# Patient Record
Sex: FEMALE | Race: WHITE | Employment: OTHER | URBAN - METROPOLITAN AREA
[De-identification: names, ages, dates, MRNs, and addresses within clinical notes are randomized per-mention and may not be internally consistent; named-entity substitution may affect disease eponyms.]

---

## 2019-08-29 ENCOUNTER — HOSPITAL ENCOUNTER (OUTPATIENT)
Age: 84
Discharge: HOME | End: 2019-08-29
Attending: FAMILY MEDICINE
Payer: MEDICARE

## 2019-08-29 VITALS
HEART RATE: 78 BPM | HEIGHT: 59.5 IN | SYSTOLIC BLOOD PRESSURE: 179 MMHG | DIASTOLIC BLOOD PRESSURE: 66 MMHG | OXYGEN SATURATION: 100 % | BODY MASS INDEX: 25.86 KG/M2 | TEMPERATURE: 98 F | WEIGHT: 130 LBS | RESPIRATION RATE: 16 BRPM

## 2019-08-29 DIAGNOSIS — S51.811A SKIN TEAR OF RIGHT FOREARM WITHOUT COMPLICATION, INITIAL ENCOUNTER: Primary | ICD-10-CM

## 2019-08-29 PROCEDURE — 90471 IMMUNIZATION ADMIN: CPT

## 2019-08-29 PROCEDURE — 99203 OFFICE O/P NEW LOW 30 MIN: CPT

## 2019-08-29 RX ORDER — TETANUS AND DIPHTHERIA TOXOIDS ADSORBED 2; 2 [LF]/.5ML; [LF]/.5ML
0.5 INJECTION INTRAMUSCULAR ONCE
Status: COMPLETED | OUTPATIENT
Start: 2019-08-29 | End: 2019-08-29

## 2019-08-29 NOTE — ED NOTES
Discharge instructions reviewed with patient and pt niece. Advised to assess wound on right arm 3 times daily with dressing changes. Pt verbalized understanding. Instructed to follow up with PCP for wound check in one day.  Monitor arm for signs of infectio

## 2019-08-29 NOTE — ED PROVIDER NOTES
Patient Seen in: [address]    History   Patient presents with:  Rash Skin Problem (integumentary)    Stated Complaint: laceration on arm    HPI  Pt is here with her niece.    Laceration to R forearm with a screen door me MDM     Disposition and Plan     Clinical Impression:  Skin tear of right forearm without complication, initial encounter  (primary encounter diagnosis)     Rec local wound care with topical mupirocin oint.    RTC or see PCP in 3-4 days for wound check

## 2019-08-29 NOTE — ED INITIAL ASSESSMENT (HPI)
Pt to IC with skin tear to right forearm. Pt states she caught her arm on her screen door on Tuesday 2 days ago.

## 2023-10-17 RX ORDER — LIDOCAINE 4 G/G
1 PATCH TOPICAL EVERY 24 HOURS
COMMUNITY
End: 2023-11-03 | Stop reason: ALTCHOICE

## 2023-10-17 RX ORDER — AMLODIPINE BESYLATE 2.5 MG/1
1 TABLET ORAL DAILY
COMMUNITY
Start: 2023-06-10 | End: 2023-11-03 | Stop reason: SDUPTHER

## 2023-10-17 RX ORDER — GABAPENTIN 100 MG/1
100 CAPSULE ORAL DAILY
COMMUNITY
Start: 2023-10-13 | End: 2023-11-03 | Stop reason: ALTCHOICE

## 2023-10-17 RX ORDER — METHENAMINE HIPPURATE 1000 MG/1
1 TABLET ORAL 2 TIMES DAILY
COMMUNITY
Start: 2023-08-08 | End: 2023-11-03 | Stop reason: ALTCHOICE

## 2023-10-17 RX ORDER — CALCIUM CITRATE/VITAMIN D3 200MG-6.25
1 TABLET ORAL DAILY
COMMUNITY
End: 2023-11-03 | Stop reason: ALTCHOICE

## 2023-10-17 RX ORDER — MIRTAZAPINE 7.5 MG/1
1 TABLET, FILM COATED ORAL AT BEDTIME
COMMUNITY
Start: 2023-08-08 | End: 2023-11-03 | Stop reason: ALTCHOICE

## 2023-10-17 RX ORDER — SACCHAROMYCES BOULARDII 250 MG
250 CAPSULE ORAL DAILY
COMMUNITY
End: 2023-11-22

## 2023-10-17 RX ORDER — CALCIUM CARBONATE/VITAMIN D3 600 MG-10
1 TABLET ORAL DAILY
COMMUNITY
Start: 2023-10-13 | End: 2023-11-03 | Stop reason: ALTCHOICE

## 2023-10-17 RX ORDER — OMEPRAZOLE 20 MG/1
1 CAPSULE, DELAYED RELEASE ORAL DAILY
COMMUNITY
Start: 2023-06-10 | End: 2023-11-22

## 2023-10-17 RX ORDER — URSODIOL 250 MG/1
250 TABLET, FILM COATED ORAL DAILY
COMMUNITY
Start: 2023-08-01 | End: 2023-11-30

## 2023-10-17 RX ORDER — ANTIARTHRITIC COMBINATION NO.2 900 MG
TABLET ORAL
COMMUNITY
End: 2023-11-21

## 2023-10-17 RX ORDER — SENNOSIDES 8.6 MG
650 CAPSULE ORAL
COMMUNITY
End: 2023-11-29

## 2023-10-17 RX ORDER — CARBOXYMETHYLCELLULOSE SODIUM 5 MG/ML
1 SOLUTION/ DROPS OPHTHALMIC 2 TIMES DAILY
COMMUNITY
Start: 2023-06-07 | End: 2023-11-22 | Stop reason: ALTCHOICE

## 2023-11-03 ENCOUNTER — OFFICE VISIT (OUTPATIENT)
Dept: INTERNAL MEDICINE | Age: 88
End: 2023-11-03

## 2023-11-03 ENCOUNTER — LAB SERVICES (OUTPATIENT)
Dept: LAB | Age: 88
End: 2023-11-03

## 2023-11-03 VITALS
BODY MASS INDEX: 23.95 KG/M2 | RESPIRATION RATE: 20 BRPM | SYSTOLIC BLOOD PRESSURE: 128 MMHG | HEIGHT: 57 IN | DIASTOLIC BLOOD PRESSURE: 52 MMHG | HEART RATE: 76 BPM | WEIGHT: 111 LBS

## 2023-11-03 DIAGNOSIS — M35.3 POLYMYALGIA RHEUMATICA (CMD): ICD-10-CM

## 2023-11-03 DIAGNOSIS — K80.50 BILIARY COLIC: ICD-10-CM

## 2023-11-03 DIAGNOSIS — Z01.89 ROUTINE LAB DRAW: ICD-10-CM

## 2023-11-03 DIAGNOSIS — I10 PRIMARY HYPERTENSION: ICD-10-CM

## 2023-11-03 DIAGNOSIS — M06.9 RHEUMATOID ARTHRITIS, INVOLVING UNSPECIFIED SITE, UNSPECIFIED WHETHER RHEUMATOID FACTOR PRESENT (CMD): ICD-10-CM

## 2023-11-03 DIAGNOSIS — K21.9 GASTROESOPHAGEAL REFLUX DISEASE, UNSPECIFIED WHETHER ESOPHAGITIS PRESENT: ICD-10-CM

## 2023-11-03 DIAGNOSIS — I10 PRIMARY HYPERTENSION: Primary | ICD-10-CM

## 2023-11-03 LAB
ALBUMIN SERPL-MCNC: 3.3 G/DL (ref 3.6–5.1)
ALBUMIN/GLOB SERPL: 0.8 {RATIO} (ref 1–2.4)
ALP SERPL-CCNC: 61 UNITS/L (ref 45–117)
ALT SERPL-CCNC: 7 UNITS/L
ANION GAP SERPL CALC-SCNC: 12 MMOL/L (ref 7–19)
AST SERPL-CCNC: 15 UNITS/L
BILIRUB SERPL-MCNC: 0.3 MG/DL (ref 0.2–1)
BUN SERPL-MCNC: 30 MG/DL (ref 6–20)
BUN/CREAT SERPL: 21 (ref 7–25)
CALCIUM SERPL-MCNC: 9 MG/DL (ref 8.4–10.2)
CHLORIDE SERPL-SCNC: 103 MMOL/L (ref 97–110)
CO2 SERPL-SCNC: 26 MMOL/L (ref 21–32)
CREAT SERPL-MCNC: 1.4 MG/DL (ref 0.51–0.95)
EGFRCR SERPLBLD CKD-EPI 2021: 33 ML/MIN/{1.73_M2}
FASTING DURATION TIME PATIENT: ABNORMAL H
GLOBULIN SER-MCNC: 3.9 G/DL (ref 2–4)
GLUCOSE SERPL-MCNC: 106 MG/DL (ref 70–99)
POTASSIUM SERPL-SCNC: 4.8 MMOL/L (ref 3.4–5.1)
PROT SERPL-MCNC: 7.2 G/DL (ref 6.4–8.2)
SODIUM SERPL-SCNC: 136 MMOL/L (ref 135–145)
TSH SERPL-ACNC: 0.79 MCUNITS/ML (ref 0.35–5)

## 2023-11-03 PROCEDURE — 99204 OFFICE O/P NEW MOD 45 MIN: CPT | Performed by: STUDENT IN AN ORGANIZED HEALTH CARE EDUCATION/TRAINING PROGRAM

## 2023-11-03 PROCEDURE — 36415 COLL VENOUS BLD VENIPUNCTURE: CPT | Performed by: INTERNAL MEDICINE

## 2023-11-03 PROCEDURE — 84443 ASSAY THYROID STIM HORMONE: CPT | Performed by: CLINICAL MEDICAL LABORATORY

## 2023-11-03 PROCEDURE — 80053 COMPREHEN METABOLIC PANEL: CPT | Performed by: CLINICAL MEDICAL LABORATORY

## 2023-11-03 RX ORDER — AMLODIPINE BESYLATE 5 MG/1
5 TABLET ORAL DAILY
COMMUNITY

## 2023-11-03 ASSESSMENT — PATIENT HEALTH QUESTIONNAIRE - PHQ9
CLINICAL INTERPRETATION OF PHQ2 SCORE: NO FURTHER SCREENING NEEDED
1. LITTLE INTEREST OR PLEASURE IN DOING THINGS: NOT AT ALL
SUM OF ALL RESPONSES TO PHQ9 QUESTIONS 1 AND 2: 0
SUM OF ALL RESPONSES TO PHQ9 QUESTIONS 1 AND 2: 0
2. FEELING DOWN, DEPRESSED OR HOPELESS: NOT AT ALL

## 2023-11-05 ENCOUNTER — E-ADVICE (OUTPATIENT)
Dept: INTERNAL MEDICINE | Age: 88
End: 2023-11-05

## 2023-11-05 ENCOUNTER — HOSPITAL ENCOUNTER (EMERGENCY)
Age: 88
Discharge: HOME | End: 2023-11-05
Attending: EMERGENCY MEDICINE

## 2023-11-05 ENCOUNTER — APPOINTMENT (OUTPATIENT)
Dept: CT IMAGING | Age: 88
End: 2023-11-05
Attending: EMERGENCY MEDICINE

## 2023-11-05 VITALS
BODY MASS INDEX: 25.86 KG/M2 | TEMPERATURE: 97.9 F | RESPIRATION RATE: 20 BRPM | DIASTOLIC BLOOD PRESSURE: 72 MMHG | WEIGHT: 119.49 LBS | SYSTOLIC BLOOD PRESSURE: 158 MMHG | OXYGEN SATURATION: 96 % | HEART RATE: 90 BPM
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
## 2023-11-05 DIAGNOSIS — N39.0 ACUTE UTI: Primary | ICD-10-CM

## 2023-11-05 LAB
ALBUMIN SERPL-MCNC: 3.5 G/DL (ref 3.6–5.1)
ALBUMIN/GLOB SERPL: 0.9 {RATIO} (ref 1–2.4)
ALP SERPL-CCNC: 65 UNITS/L (ref 45–117)
ALT SERPL-CCNC: 14 UNITS/L
ANION GAP SERPL CALC-SCNC: 9 MMOL/L (ref 7–19)
APPEARANCE UR: CLEAR
AST SERPL-CCNC: 13 UNITS/L
ATRIAL RATE (BPM): 81
BACTERIA #/AREA URNS HPF: ABNORMAL /HPF
BASOPHILS # BLD: 0.1 K/MCL (ref 0–0.3)
BASOPHILS NFR BLD: 1 %
BILIRUB SERPL-MCNC: 0.4 MG/DL (ref 0.2–1)
BILIRUB UR QL STRIP: NEGATIVE
BUN SERPL-MCNC: 27 MG/DL (ref 6–20)
BUN/CREAT SERPL: 22 (ref 7–25)
CALCIUM SERPL-MCNC: 9.4 MG/DL (ref 8.4–10.2)
CHLORIDE SERPL-SCNC: 103 MMOL/L (ref 97–110)
CO2 SERPL-SCNC: 29 MMOL/L (ref 21–32)
COLOR UR: ABNORMAL
CREAT SERPL-MCNC: 1.23 MG/DL (ref 0.51–0.95)
DEPRECATED RDW RBC: 46.1 FL (ref 39–50)
EGFRCR SERPLBLD CKD-EPI 2021: 39 ML/MIN/{1.73_M2}
EOSINOPHIL # BLD: 0.2 K/MCL (ref 0–0.5)
EOSINOPHIL NFR BLD: 2 %
ERYTHROCYTE [DISTWIDTH] IN BLOOD: 14 % (ref 11–15)
FASTING DURATION TIME PATIENT: ABNORMAL H
GLOBULIN SER-MCNC: 3.9 G/DL (ref 2–4)
GLUCOSE SERPL-MCNC: 104 MG/DL (ref 70–99)
GLUCOSE UR STRIP-MCNC: NEGATIVE MG/DL
HCT VFR BLD CALC: 38.6 % (ref 36–46.5)
HGB BLD-MCNC: 12.6 G/DL (ref 12–15.5)
HGB UR QL STRIP: ABNORMAL
HYALINE CASTS #/AREA URNS LPF: ABNORMAL /LPF
IMM GRANULOCYTES # BLD AUTO: 0 K/MCL (ref 0–0.2)
IMM GRANULOCYTES # BLD: 0 %
KETONES UR STRIP-MCNC: NEGATIVE MG/DL
LEUKOCYTE ESTERASE UR QL STRIP: ABNORMAL
LYMPHOCYTES # BLD: 1 K/MCL (ref 1–4)
LYMPHOCYTES NFR BLD: 13 %
MCH RBC QN AUTO: 29.2 PG (ref 26–34)
MCHC RBC AUTO-ENTMCNC: 32.6 G/DL (ref 32–36.5)
MCV RBC AUTO: 89.4 FL (ref 78–100)
MONOCYTES # BLD: 0.5 K/MCL (ref 0.3–0.9)
MONOCYTES NFR BLD: 7 %
NEUTROPHILS # BLD: 5.6 K/MCL (ref 1.8–7.7)
NEUTROPHILS NFR BLD: 77 %
NITRITE UR QL STRIP: NEGATIVE
NRBC BLD MANUAL-RTO: 0 /100 WBC
P AXIS (DEGREES): 78
PH UR STRIP: 5.5 [PH] (ref 5–7)
PLATELET # BLD AUTO: 297 K/MCL (ref 140–450)
POTASSIUM SERPL-SCNC: 4.6 MMOL/L (ref 3.4–5.1)
PR-INTERVAL (MSEC): 242
PROT SERPL-MCNC: 7.4 G/DL (ref 6.4–8.2)
PROT UR STRIP-MCNC: ABNORMAL MG/DL
QRS-INTERVAL (MSEC): 118
QT-INTERVAL (MSEC): 372
QTC: 432
R AXIS (DEGREES): -44
RAINBOW EXTRA TUBES HOLD SPECIMEN: NORMAL
RAINBOW EXTRA TUBES HOLD SPECIMEN: NORMAL
RBC # BLD: 4.32 MIL/MCL (ref 4–5.2)
RBC #/AREA URNS HPF: ABNORMAL /HPF
REPORT TEXT: NORMAL
SODIUM SERPL-SCNC: 136 MMOL/L (ref 135–145)
SP GR UR STRIP: 1.01 (ref 1–1.03)
SQUAMOUS #/AREA URNS HPF: ABNORMAL /HPF
T AXIS (DEGREES): 68
TRANS CELLS #/AREA URNS HPF: ABNORMAL /HPF
UROBILINOGEN UR STRIP-MCNC: 0.2 MG/DL
VENTRICULAR RATE EKG/MIN (BPM): 81
WBC # BLD: 7.4 K/MCL (ref 4.2–11)
WBC #/AREA URNS HPF: >100 /HPF

## 2023-11-05 PROCEDURE — 10004180 CT HEAD WO CONTRAST

## 2023-11-05 PROCEDURE — 70450 CT HEAD/BRAIN W/O DYE: CPT | Performed by: RADIOLOGY

## 2023-11-05 PROCEDURE — 87086 URINE CULTURE/COLONY COUNT: CPT | Performed by: EMERGENCY MEDICINE

## 2023-11-05 PROCEDURE — 10002800 HB RX 250 W HCPCS: Performed by: EMERGENCY MEDICINE

## 2023-11-05 PROCEDURE — 36415 COLL VENOUS BLD VENIPUNCTURE: CPT

## 2023-11-05 PROCEDURE — 80053 COMPREHEN METABOLIC PANEL: CPT | Performed by: EMERGENCY MEDICINE

## 2023-11-05 PROCEDURE — 85025 COMPLETE CBC W/AUTO DIFF WBC: CPT | Performed by: EMERGENCY MEDICINE

## 2023-11-05 PROCEDURE — 93010 ELECTROCARDIOGRAM REPORT: CPT | Performed by: INTERNAL MEDICINE

## 2023-11-05 PROCEDURE — 96374 THER/PROPH/DIAG INJ IV PUSH: CPT

## 2023-11-05 PROCEDURE — 93005 ELECTROCARDIOGRAM TRACING: CPT | Performed by: EMERGENCY MEDICINE

## 2023-11-05 PROCEDURE — 99284 EMERGENCY DEPT VISIT MOD MDM: CPT

## 2023-11-05 PROCEDURE — 81001 URINALYSIS AUTO W/SCOPE: CPT | Performed by: EMERGENCY MEDICINE

## 2023-11-05 PROCEDURE — 99285 EMERGENCY DEPT VISIT HI MDM: CPT | Performed by: EMERGENCY MEDICINE

## 2023-11-05 RX ORDER — CEFAZOLIN SODIUM/WATER 2 G/20 ML
2000 SYRINGE (ML) INTRAVENOUS ONCE
Status: COMPLETED | OUTPATIENT
Start: 2023-11-05 | End: 2023-11-05

## 2023-11-05 RX ORDER — CEPHALEXIN 500 MG/1
500 CAPSULE ORAL 4 TIMES DAILY
Qty: 28 CAPSULE | Refills: 0 | Status: SHIPPED | OUTPATIENT
Start: 2023-11-05 | End: 2023-11-12

## 2023-11-05 RX ADMIN — CEFTRIAXONE SODIUM 2000 MG: 10 INJECTION, POWDER, FOR SOLUTION INTRAVENOUS at 11:26

## 2023-11-05 ASSESSMENT — ENCOUNTER SYMPTOMS
EYE REDNESS: 0
DIARRHEA: 0
COUGH: 0
COLOR CHANGE: 0
WEAKNESS: 1
NUMBNESS: 0
TROUBLE SWALLOWING: 0
NERVOUS/ANXIOUS: 0
FATIGUE: 1
SHORTNESS OF BREATH: 0
FEVER: 0
NAUSEA: 0
CHILLS: 0
VOMITING: 0
ABDOMINAL PAIN: 0
EYE DISCHARGE: 0

## 2023-11-05 ASSESSMENT — PAIN SCALES - GENERAL
PAINLEVEL_OUTOF10: 0
PAINLEVEL_OUTOF10: 0

## 2023-11-06 ENCOUNTER — E-ADVICE (OUTPATIENT)
Dept: INTERNAL MEDICINE | Age: 88
End: 2023-11-06

## 2023-11-06 ENCOUNTER — EXTERNAL RECORD (OUTPATIENT)
Dept: OTHER | Age: 88
End: 2023-11-06

## 2023-11-21 RX ORDER — ANTIARTHRITIC COMBINATION NO.2 900 MG
TABLET ORAL
Qty: 28 TABLET | Refills: 0 | Status: SHIPPED | OUTPATIENT
Start: 2023-11-21

## 2023-11-22 RX ORDER — NICOTINE 14MG/24HR
PATCH, TRANSDERMAL 24 HOURS TRANSDERMAL
Qty: 90 CAPSULE | Refills: 1 | Status: SHIPPED | OUTPATIENT
Start: 2023-11-22

## 2023-11-22 RX ORDER — CRANBERRY FRUIT EXTRACT 250 MG
CAPSULE ORAL
Qty: 90 CAPSULE | Refills: 1 | Status: SHIPPED | OUTPATIENT
Start: 2023-11-22

## 2023-11-22 RX ORDER — GUAIFENESIN 600 MG/1
TABLET, EXTENDED RELEASE ORAL
Qty: 28 TABLET | Refills: 0 | OUTPATIENT
Start: 2023-11-22

## 2023-11-22 RX ORDER — PREDNISONE 1 MG/1
TABLET ORAL
Qty: 270 TABLET | Refills: 1 | Status: SHIPPED | OUTPATIENT
Start: 2023-11-22

## 2023-11-22 RX ORDER — AMLODIPINE BESYLATE 5 MG/1
TABLET ORAL
Qty: 90 TABLET | Refills: 1 | Status: SHIPPED | OUTPATIENT
Start: 2023-11-22

## 2023-11-22 RX ORDER — OMEPRAZOLE 20 MG/1
CAPSULE, DELAYED RELEASE ORAL
Qty: 90 CAPSULE | Refills: 1 | Status: SHIPPED | OUTPATIENT
Start: 2023-11-22

## 2023-11-24 ENCOUNTER — E-ADVICE (OUTPATIENT)
Dept: INTERNAL MEDICINE | Age: 88
End: 2023-11-24

## 2023-11-28 ENCOUNTER — APPOINTMENT (OUTPATIENT)
Dept: INTERNAL MEDICINE | Age: 88
End: 2023-11-28

## 2023-11-29 RX ORDER — SENNOSIDES 8.6 MG
CAPSULE ORAL
Qty: 30 TABLET | Refills: 11 | Status: SHIPPED | OUTPATIENT
Start: 2023-11-29 | End: 2023-12-06 | Stop reason: DRUGHIGH

## 2023-11-30 RX ORDER — URSODIOL 250 MG/1
250 TABLET, FILM COATED ORAL DAILY
Qty: 28 TABLET | Refills: 11 | Status: SHIPPED | OUTPATIENT
Start: 2023-11-30

## 2023-12-06 RX ORDER — SENNOSIDES 8.6 MG
650 CAPSULE ORAL EVERY 8 HOURS PRN
Qty: 60 TABLET | Refills: 11 | Status: SHIPPED | OUTPATIENT
Start: 2023-12-06

## 2023-12-08 ENCOUNTER — EXTERNAL LAB (OUTPATIENT)
Dept: INTERNAL MEDICINE | Age: 88
End: 2023-12-08

## 2023-12-08 ENCOUNTER — TELEPHONE (OUTPATIENT)
Dept: INTERNAL MEDICINE | Age: 88
End: 2023-12-08

## 2023-12-08 DIAGNOSIS — R39.9 GU (GENITOURINARY) SYMPTOMS: Primary | ICD-10-CM

## 2023-12-08 LAB
APPEARANCE UR: NORMAL
BACTERIA #/AREA URNS HPF: NORMAL /HPF
BILIRUB UR QL: NEGATIVE
COLOR UR: YELLOW
GLUCOSE UR-MCNC: NEGATIVE MG/DL
HGB UR QL: NORMAL
HYALINE CASTS #/AREA URNS LPF: NORMAL /LPF
KETONES UR-MCNC: NEGATIVE MG/DL
LAB RESULT: NORMAL
LEUKOCYTE ESTERASE UR QL STRIP: NORMAL
NITRITE UR QL: POSITIVE
PH UR: 5.5 [PH] (ref 5–8)
PROT UR QL: NORMAL
RBC #/AREA URNS HPF: NORMAL /HPF
SP GR UR: 1.01 (ref 1–1.03)
SQUAMOUS #/AREA URNS HPF: NORMAL /HPF
WBC #/AREA URNS HPF: NORMAL /HPF

## 2023-12-11 ENCOUNTER — E-ADVICE (OUTPATIENT)
Dept: INTERNAL MEDICINE | Age: 88
End: 2023-12-11

## 2023-12-12 ENCOUNTER — TELEPHONE (OUTPATIENT)
Dept: INTERNAL MEDICINE | Age: 88
End: 2023-12-12

## 2023-12-12 RX ORDER — NITROFURANTOIN 25; 75 MG/1; MG/1
100 CAPSULE ORAL 2 TIMES DAILY
Qty: 10 CAPSULE | Refills: 0 | Status: SHIPPED | OUTPATIENT
Start: 2023-12-12 | End: 2023-12-17

## 2023-12-12 RX ORDER — ANTIARTHRITIC COMBINATION NO.2 900 MG
TABLET ORAL
Qty: 28 TABLET | Refills: 5 | Status: SHIPPED | OUTPATIENT
Start: 2023-12-12

## 2023-12-20 ENCOUNTER — TELEPHONE (OUTPATIENT)
Dept: INTERNAL MEDICINE | Age: 88
End: 2023-12-20

## 2023-12-24 ENCOUNTER — HOSPITAL ENCOUNTER (EMERGENCY)
Age: 88
Discharge: NURSING, REHAB OR LONG-TERM CARE | End: 2023-12-25

## 2023-12-24 ENCOUNTER — APPOINTMENT (OUTPATIENT)
Dept: GENERAL RADIOLOGY | Age: 88
End: 2023-12-24

## 2023-12-24 DIAGNOSIS — R76.8 COVID-19 VIRUS IGG ANTIBODY DETECTED: ICD-10-CM

## 2023-12-24 DIAGNOSIS — E87.1 HYPONATREMIA: ICD-10-CM

## 2023-12-24 DIAGNOSIS — E83.42 HYPOMAGNESEMIA: ICD-10-CM

## 2023-12-24 DIAGNOSIS — N39.0 UTI (URINARY TRACT INFECTION), UNCOMPLICATED: Primary | ICD-10-CM

## 2023-12-24 LAB
ALBUMIN SERPL-MCNC: 3.2 G/DL (ref 3.6–5.1)
ALBUMIN/GLOB SERPL: 0.8 {RATIO} (ref 1–2.4)
ALP SERPL-CCNC: 64 UNITS/L (ref 45–117)
ALT SERPL-CCNC: 12 UNITS/L
ANION GAP SERPL CALC-SCNC: 16 MMOL/L (ref 7–19)
APPEARANCE UR: ABNORMAL
AST SERPL-CCNC: 28 UNITS/L
BACTERIA #/AREA URNS HPF: ABNORMAL /HPF
BASOPHILS # BLD: 0.1 K/MCL (ref 0–0.3)
BASOPHILS NFR BLD: 1 %
BILIRUB SERPL-MCNC: 0.4 MG/DL (ref 0.2–1)
BILIRUB UR QL STRIP: NEGATIVE
BUN SERPL-MCNC: 25 MG/DL (ref 6–20)
BUN/CREAT SERPL: 20 (ref 7–25)
CALCIUM SERPL-MCNC: 9.5 MG/DL (ref 8.4–10.2)
CHLORIDE SERPL-SCNC: 95 MMOL/L (ref 97–110)
CO2 SERPL-SCNC: 23 MMOL/L (ref 21–32)
COLOR UR: YELLOW
CREAT SERPL-MCNC: 1.24 MG/DL (ref 0.51–0.95)
DEPRECATED RDW RBC: 41.8 FL (ref 39–50)
EGFRCR SERPLBLD CKD-EPI 2021: 38 ML/MIN/{1.73_M2}
EOSINOPHIL # BLD: 0.3 K/MCL (ref 0–0.5)
EOSINOPHIL NFR BLD: 2 %
ERYTHROCYTE [DISTWIDTH] IN BLOOD: 13.2 % (ref 11–15)
FASTING DURATION TIME PATIENT: ABNORMAL H
FLUAV RNA RESP QL NAA+PROBE: NOT DETECTED
FLUBV RNA RESP QL NAA+PROBE: NOT DETECTED
GLOBULIN SER-MCNC: 4.1 G/DL (ref 2–4)
GLUCOSE SERPL-MCNC: 106 MG/DL (ref 70–99)
GLUCOSE UR STRIP-MCNC: NEGATIVE MG/DL
HCT VFR BLD CALC: 35.4 % (ref 36–46.5)
HGB BLD-MCNC: 12 G/DL (ref 12–15.5)
HGB UR QL STRIP: ABNORMAL
HYALINE CASTS #/AREA URNS LPF: ABNORMAL /LPF
IMM GRANULOCYTES # BLD AUTO: 0.1 K/MCL (ref 0–0.2)
IMM GRANULOCYTES # BLD: 1 %
KETONES UR STRIP-MCNC: NEGATIVE MG/DL
LEUKOCYTE ESTERASE UR QL STRIP: ABNORMAL
LYMPHOCYTES # BLD: 1.8 K/MCL (ref 1–4)
LYMPHOCYTES NFR BLD: 16 %
MAGNESIUM SERPL-MCNC: 1.4 MG/DL (ref 1.7–2.4)
MCH RBC QN AUTO: 29.3 PG (ref 26–34)
MCHC RBC AUTO-ENTMCNC: 33.9 G/DL (ref 32–36.5)
MCV RBC AUTO: 86.6 FL (ref 78–100)
MONOCYTES # BLD: 1 K/MCL (ref 0.3–0.9)
MONOCYTES NFR BLD: 9 %
NEUTROPHILS # BLD: 8.2 K/MCL (ref 1.8–7.7)
NEUTROPHILS NFR BLD: 71 %
NITRITE UR QL STRIP: NEGATIVE
NRBC BLD MANUAL-RTO: 0 /100 WBC
PH UR STRIP: 6 [PH] (ref 5–7)
PLATELET # BLD AUTO: 390 K/MCL (ref 140–450)
POTASSIUM SERPL-SCNC: 4.2 MMOL/L (ref 3.4–5.1)
POTASSIUM SERPL-SCNC: 6 MMOL/L (ref 3.4–5.1)
PROT SERPL-MCNC: 7.3 G/DL (ref 6.4–8.2)
PROT UR STRIP-MCNC: 100 MG/DL
RBC # BLD: 4.09 MIL/MCL (ref 4–5.2)
RBC #/AREA URNS HPF: ABNORMAL /HPF
RSV AG NPH QL IA.RAPID: NOT DETECTED
SARS-COV-2 N GENE CT SPEC QN NAA N2: 35.2
SARS-COV-2 RNA RESP QL NAA+PROBE: DETECTED
SERVICE CMNT-IMP: ABNORMAL
SODIUM SERPL-SCNC: 128 MMOL/L (ref 135–145)
SP GR UR STRIP: 1.02 (ref 1–1.03)
SQUAMOUS #/AREA URNS HPF: ABNORMAL /HPF
UROBILINOGEN UR STRIP-MCNC: 0.2 MG/DL
WBC # BLD: 11.4 K/MCL (ref 4.2–11)
WBC #/AREA URNS HPF: >100 /HPF

## 2023-12-24 PROCEDURE — 99284 EMERGENCY DEPT VISIT MOD MDM: CPT

## 2023-12-24 PROCEDURE — 96367 TX/PROPH/DG ADDL SEQ IV INF: CPT

## 2023-12-24 PROCEDURE — 96375 TX/PRO/DX INJ NEW DRUG ADDON: CPT

## 2023-12-24 PROCEDURE — 83735 ASSAY OF MAGNESIUM: CPT

## 2023-12-24 PROCEDURE — 36415 COLL VENOUS BLD VENIPUNCTURE: CPT

## 2023-12-24 PROCEDURE — 85025 COMPLETE CBC W/AUTO DIFF WBC: CPT

## 2023-12-24 PROCEDURE — 87086 URINE CULTURE/COLONY COUNT: CPT

## 2023-12-24 PROCEDURE — 96365 THER/PROPH/DIAG IV INF INIT: CPT

## 2023-12-24 PROCEDURE — 10002800 HB RX 250 W HCPCS

## 2023-12-24 PROCEDURE — 0241U COVID/FLU/RSV PANEL: CPT

## 2023-12-24 PROCEDURE — 81001 URINALYSIS AUTO W/SCOPE: CPT

## 2023-12-24 PROCEDURE — 84132 ASSAY OF SERUM POTASSIUM: CPT

## 2023-12-24 PROCEDURE — 71045 X-RAY EXAM CHEST 1 VIEW: CPT

## 2023-12-24 PROCEDURE — 71045 X-RAY EXAM CHEST 1 VIEW: CPT | Performed by: RADIOLOGY

## 2023-12-24 PROCEDURE — 80053 COMPREHEN METABOLIC PANEL: CPT

## 2023-12-24 PROCEDURE — 10002807 HB RX 258

## 2023-12-24 RX ORDER — DOXYCYCLINE 100 MG/1
100 TABLET ORAL 2 TIMES DAILY
Qty: 10 TABLET | Refills: 0 | Status: SHIPPED | OUTPATIENT
Start: 2023-12-24 | End: 2023-12-24

## 2023-12-24 RX ORDER — DOXYCYCLINE 100 MG/1
100 TABLET ORAL 2 TIMES DAILY
Qty: 14 TABLET | Refills: 0 | Status: SHIPPED | OUTPATIENT
Start: 2023-12-24 | End: 2023-12-31

## 2023-12-24 RX ORDER — CEFAZOLIN SODIUM/WATER 1 G/10 ML
1000 SYRINGE (ML) INTRAVENOUS ONCE
Status: COMPLETED | OUTPATIENT
Start: 2023-12-24 | End: 2023-12-24

## 2023-12-24 RX ADMIN — CEFTRIAXONE SODIUM 1000 MG: 10 INJECTION, POWDER, FOR SOLUTION INTRAVENOUS at 21:04

## 2023-12-24 RX ADMIN — AZITHROMYCIN 500 MG: 500 INJECTION, POWDER, LYOPHILIZED, FOR SOLUTION INTRAVENOUS at 23:40

## 2023-12-24 RX ADMIN — MAGNESIUM SULFATE HEPTAHYDRATE 2 G: 40 INJECTION, SOLUTION INTRAVENOUS at 21:05

## 2023-12-24 RX ADMIN — SODIUM CHLORIDE 500 ML: 9 INJECTION, SOLUTION INTRAVENOUS at 21:04

## 2023-12-25 VITALS
SYSTOLIC BLOOD PRESSURE: 165 MMHG | TEMPERATURE: 99.1 F | RESPIRATION RATE: 18 BRPM | OXYGEN SATURATION: 94 % | DIASTOLIC BLOOD PRESSURE: 70 MMHG | HEART RATE: 95 BPM

## 2023-12-26 ENCOUNTER — EXTERNAL LAB (OUTPATIENT)
Dept: INTERNAL MEDICINE | Age: 88
End: 2023-12-26

## 2023-12-26 ENCOUNTER — E-ADVICE (OUTPATIENT)
Dept: INTERNAL MEDICINE | Age: 88
End: 2023-12-26

## 2023-12-26 DIAGNOSIS — E83.42 HYPOMAGNESEMIA: ICD-10-CM

## 2023-12-26 DIAGNOSIS — E87.1 HYPONATREMIA: Primary | ICD-10-CM

## 2023-12-26 LAB
BACTERIA UR CULT: ABNORMAL
BUN SERPL-MCNC: 24 MG/DL (ref 7–25)
BUN/CREAT SERPL: 15 (CALC) (ref 6–22)
CALCIUM SERPL-MCNC: 9.5 MG/DL (ref 8.6–10.4)
CHLORIDE SERPL-SCNC: 95 MMOL/L (ref 98–110)
CO2 SERPL-SCNC: 25 MMOL/L (ref 20–32)
CREAT SERPL-MCNC: 1.59 MG/DL (ref 0.8–0.95)
GFR SERPLBLD SCHWARTZ-ARVRAT: 28 ML/MIN/1.73
GLUCOSE SERPL-MCNC: 112 MG/DL (ref 65–139)
LENGTH OF FAST TIME PATIENT: NO H
MAGNESIUM SERPL-MCNC: 2.1 MG/DL (ref 1.5–2.5)
POTASSIUM SERPL-SCNC: 5 MMOL/L (ref 3.5–5.3)
SODIUM SERPL-SCNC: 127 MMOL/L (ref 135–146)

## 2023-12-26 RX ORDER — AMOXICILLIN AND CLAVULANATE POTASSIUM 500; 125 MG/1; MG/1
TABLET, FILM COATED ORAL
Qty: 21 TABLET | Refills: 0 | Status: SHIPPED | OUTPATIENT
Start: 2023-12-26

## 2024-01-01 ENCOUNTER — EXTERNAL RECORD (OUTPATIENT)
Dept: OTHER | Age: 89
End: 2024-01-01

## 2024-01-19 ENCOUNTER — APPOINTMENT (OUTPATIENT)
Dept: INTERNAL MEDICINE | Age: 89
End: 2024-01-19

## 2024-01-19 ENCOUNTER — LAB SERVICES (OUTPATIENT)
Dept: LAB | Age: 89
End: 2024-01-19

## 2024-01-19 VITALS
HEIGHT: 57 IN | WEIGHT: 111.9 LBS | DIASTOLIC BLOOD PRESSURE: 60 MMHG | RESPIRATION RATE: 16 BRPM | SYSTOLIC BLOOD PRESSURE: 120 MMHG | BODY MASS INDEX: 24.14 KG/M2 | HEART RATE: 80 BPM

## 2024-01-19 DIAGNOSIS — E83.42 HYPOMAGNESEMIA: ICD-10-CM

## 2024-01-19 DIAGNOSIS — N39.0 RECURRENT UTI: Primary | ICD-10-CM

## 2024-01-19 DIAGNOSIS — E87.1 HYPONATREMIA: ICD-10-CM

## 2024-01-19 LAB
ANION GAP SERPL CALC-SCNC: 14 MMOL/L (ref 7–19)
BUN SERPL-MCNC: 19 MG/DL (ref 6–20)
BUN/CREAT SERPL: 15 (ref 7–25)
CALCIUM SERPL-MCNC: 9.3 MG/DL (ref 8.4–10.2)
CHLORIDE SERPL-SCNC: 97 MMOL/L (ref 97–110)
CO2 SERPL-SCNC: 26 MMOL/L (ref 21–32)
CREAT SERPL-MCNC: 1.28 MG/DL (ref 0.51–0.95)
EGFRCR SERPLBLD CKD-EPI 2021: 36 ML/MIN/{1.73_M2}
FASTING DURATION TIME PATIENT: ABNORMAL H
GLUCOSE SERPL-MCNC: 111 MG/DL (ref 70–99)
MAGNESIUM SERPL-MCNC: 1.6 MG/DL (ref 1.7–2.4)
POTASSIUM SERPL-SCNC: 4.5 MMOL/L (ref 3.4–5.1)
SODIUM SERPL-SCNC: 132 MMOL/L (ref 135–145)

## 2024-01-19 PROCEDURE — 36415 COLL VENOUS BLD VENIPUNCTURE: CPT | Performed by: INTERNAL MEDICINE

## 2024-01-19 PROCEDURE — 83735 ASSAY OF MAGNESIUM: CPT | Performed by: CLINICAL MEDICAL LABORATORY

## 2024-01-19 PROCEDURE — 80048 BASIC METABOLIC PNL TOTAL CA: CPT | Performed by: CLINICAL MEDICAL LABORATORY

## 2024-01-19 PROCEDURE — 99214 OFFICE O/P EST MOD 30 MIN: CPT | Performed by: STUDENT IN AN ORGANIZED HEALTH CARE EDUCATION/TRAINING PROGRAM

## 2024-01-22 ENCOUNTER — EXTERNAL LAB (OUTPATIENT)
Dept: INTERNAL MEDICINE | Age: 89
End: 2024-01-22

## 2024-01-22 LAB
APPEARANCE UR: ABNORMAL
BACTERIA #/AREA URNS HPF: ABNORMAL /HPF
BACTERIA UR CULT: ABNORMAL
BILIRUB UR QL: NEGATIVE
COLOR UR: YELLOW
GLUCOSE UR-MCNC: NEGATIVE MG/DL
HGB UR QL: NEGATIVE
HYALINE CASTS #/AREA URNS LPF: ABNORMAL /LPF
KETONES UR-MCNC: NEGATIVE MG/DL
LAB RESULT: NORMAL
LEUKOCYTE ESTERASE UR QL STRIP: ABNORMAL
NITRITE UR QL: NEGATIVE
PH UR: ABNORMAL [PH] (ref 5–8)
PROT UR QL: NEGATIVE
RBC #/AREA URNS HPF: ABNORMAL /HPF
SP GR UR: 1.01 (ref 1–1.03)
SQUAMOUS #/AREA URNS HPF: ABNORMAL /HPF
URNS CMNT MICRO: ABNORMAL
WBC #/AREA URNS HPF: ABNORMAL /HPF

## 2024-01-23 ENCOUNTER — TELEPHONE (OUTPATIENT)
Dept: INTERNAL MEDICINE | Age: 89
End: 2024-01-23

## 2024-01-23 RX ORDER — HYDROCORTISONE ACETATE 25 MG/1
25 SUPPOSITORY RECTAL 2 TIMES DAILY
Qty: 30 SUPPOSITORY | Refills: 0 | Status: SHIPPED | OUTPATIENT
Start: 2024-01-23

## 2024-01-25 ENCOUNTER — TELEPHONE (OUTPATIENT)
Dept: INTERNAL MEDICINE | Age: 89
End: 2024-01-25

## 2024-01-27 ENCOUNTER — E-ADVICE (OUTPATIENT)
Dept: INTERNAL MEDICINE | Age: 89
End: 2024-01-27

## 2024-02-09 ENCOUNTER — CASE MANAGEMENT (OUTPATIENT)
Dept: CARE COORDINATION | Age: 89
End: 2024-02-09

## 2024-02-09 ASSESSMENT — PATIENT HEALTH QUESTIONNAIRE - PHQ9
CLINICAL INTERPRETATION OF PHQ2 SCORE: NO FURTHER SCREENING NEEDED
2. FEELING DOWN, DEPRESSED OR HOPELESS: SEVERAL DAYS
SUM OF ALL RESPONSES TO PHQ9 QUESTIONS 1 AND 2: 2
CLINICAL INTERPRETATION OF PHQ2 SCORE: 2
1. LITTLE INTEREST OR PLEASURE IN DOING THINGS: SEVERAL DAYS

## 2024-02-16 ENCOUNTER — APPOINTMENT (OUTPATIENT)
Dept: INTERNAL MEDICINE | Age: 89
End: 2024-02-16

## 2024-03-28 ENCOUNTER — TELEPHONE (OUTPATIENT)
Dept: INTERNAL MEDICINE | Age: 89
End: 2024-03-28

## 2024-04-02 ENCOUNTER — TELEPHONE (OUTPATIENT)
Dept: INTERNAL MEDICINE | Age: 89
End: 2024-04-02

## 2024-04-02 DIAGNOSIS — R53.1 WEAKNESS: Primary | ICD-10-CM

## 2024-04-16 ENCOUNTER — EXTERNAL LAB (OUTPATIENT)
Dept: HEALTH INFORMATION MANAGEMENT | Age: 89
End: 2024-04-16

## 2024-04-16 DIAGNOSIS — R39.9 GU (GENITOURINARY) SYMPTOMS: Primary | ICD-10-CM

## 2024-04-16 LAB
AMORPH SED URNS QL MICRO: ABNORMAL /HPF
APPEARANCE UR: ABNORMAL
BACTERIA #/AREA URNS HPF: ABNORMAL /HPF
BILIRUB UR QL: NEGATIVE
CAOX CRY URNS QL MICRO: ABNORMAL /HPF
COLOR UR: YELLOW
GLUCOSE UR-MCNC: NEGATIVE MG/DL
HGB UR QL: ABNORMAL
HYALINE CASTS #/AREA URNS LPF: ABNORMAL /LPF
KETONES UR-MCNC: NEGATIVE MG/DL
LAB RESULT: NORMAL
LEUKOCYTE ESTERASE UR QL STRIP: ABNORMAL
NITRITE UR QL: NEGATIVE
PH UR: 6.5 [PH] (ref 5–8)
PROT UR QL: ABNORMAL
RBC #/AREA URNS HPF: ABNORMAL /HPF
SP GR UR: 1.01 (ref 1–1.03)
SQUAMOUS #/AREA URNS HPF: ABNORMAL /HPF
WBC #/AREA URNS HPF: ABNORMAL /HPF

## 2024-04-18 ENCOUNTER — TELEPHONE (OUTPATIENT)
Dept: INTERNAL MEDICINE | Age: 89
End: 2024-04-18

## 2024-04-18 RX ORDER — AMOXICILLIN AND CLAVULANATE POTASSIUM 875; 125 MG/1; MG/1
1 TABLET, FILM COATED ORAL 2 TIMES DAILY
Qty: 14 TABLET | Refills: 0 | Status: SHIPPED | OUTPATIENT
Start: 2024-04-18 | End: 2024-04-25

## 2024-04-26 ENCOUNTER — NURSE TRIAGE (OUTPATIENT)
Dept: INTERNAL MEDICINE | Age: 89
End: 2024-04-26

## 2024-04-26 DIAGNOSIS — S81.001A OPEN WOUND OF RIGHT KNEE, LEG, AND ANKLE, INITIAL ENCOUNTER: Primary | ICD-10-CM

## 2024-04-26 DIAGNOSIS — S91.001A OPEN WOUND OF RIGHT KNEE, LEG, AND ANKLE, INITIAL ENCOUNTER: Primary | ICD-10-CM

## 2024-04-26 DIAGNOSIS — S81.801A OPEN WOUND OF RIGHT KNEE, LEG, AND ANKLE, INITIAL ENCOUNTER: Primary | ICD-10-CM

## 2024-05-04 ENCOUNTER — HOSPITAL ENCOUNTER (EMERGENCY)
Age: 89
Discharge: HOME | End: 2024-05-04
Attending: EMERGENCY MEDICINE

## 2024-05-04 VITALS
BODY MASS INDEX: 23.16 KG/M2 | DIASTOLIC BLOOD PRESSURE: 67 MMHG | TEMPERATURE: 97.5 F | HEIGHT: 59 IN | OXYGEN SATURATION: 97 % | RESPIRATION RATE: 14 BRPM | WEIGHT: 114.86 LBS | SYSTOLIC BLOOD PRESSURE: 155 MMHG | HEART RATE: 71 BPM

## 2024-05-04 DIAGNOSIS — B02.9 HERPES ZOSTER WITHOUT COMPLICATION: Primary | ICD-10-CM

## 2024-05-04 PROCEDURE — 99283 EMERGENCY DEPT VISIT LOW MDM: CPT

## 2024-05-04 PROCEDURE — 99283 EMERGENCY DEPT VISIT LOW MDM: CPT | Performed by: EMERGENCY MEDICINE

## 2024-05-04 PROCEDURE — 10002803 HB RX 637: Performed by: EMERGENCY MEDICINE

## 2024-05-04 RX ORDER — VALACYCLOVIR HYDROCHLORIDE 500 MG/1
1000 TABLET, FILM COATED ORAL ONCE
Status: COMPLETED | OUTPATIENT
Start: 2024-05-04 | End: 2024-05-04

## 2024-05-04 RX ORDER — DIPHENHYDRAMINE HCL 25 MG
12.5 TABLET ORAL 3 TIMES DAILY PRN
Qty: 30 TABLET | Refills: 0 | Status: SHIPPED | OUTPATIENT
Start: 2024-05-04

## 2024-05-04 RX ORDER — FAMCICLOVIR 500 MG/1
500 TABLET ORAL 3 TIMES DAILY
Qty: 30 TABLET | Refills: 0 | Status: SHIPPED | OUTPATIENT
Start: 2024-05-04 | End: 2024-05-14

## 2024-05-04 RX ADMIN — VALACYCLOVIR HYDROCHLORIDE 1000 MG: 500 TABLET, FILM COATED ORAL at 12:31

## 2024-05-04 SDOH — SOCIAL STABILITY: SOCIAL INSECURITY: HOW OFTEN DOES ANYONE, INCLUDING FAMILY AND FRIENDS, THREATEN YOU WITH HARM?: NEVER

## 2024-05-04 SDOH — SOCIAL STABILITY: SOCIAL INSECURITY: HOW OFTEN DOES ANYONE, INCLUDING FAMILY AND FRIENDS, SCREAM OR CURSE AT YOU?: NEVER

## 2024-05-04 SDOH — SOCIAL STABILITY: SOCIAL INSECURITY: HOW OFTEN DOES ANYONE, INCLUDING FAMILY AND FRIENDS, INSULT OR TALK DOWN TO YOU?: NEVER

## 2024-05-04 SDOH — SOCIAL STABILITY: SOCIAL INSECURITY: HOW OFTEN DOES ANYONE, INCLUDING FAMILY AND FRIENDS, PHYSICALLY HURT YOU?: NEVER

## 2024-05-04 ASSESSMENT — ENCOUNTER SYMPTOMS
FATIGUE: 0
FEVER: 0
ACTIVITY CHANGE: 0
ABDOMINAL PAIN: 0
APPETITE CHANGE: 0
COLOR CHANGE: 1

## 2024-05-04 ASSESSMENT — PAIN SCALES - GENERAL: PAINLEVEL_OUTOF10: 0

## 2024-05-07 RX ORDER — AMLODIPINE BESYLATE 5 MG/1
TABLET ORAL
Qty: 28 TABLET | Refills: 0 | Status: SHIPPED | OUTPATIENT
Start: 2024-05-07

## 2024-05-07 RX ORDER — MULTIVIT-MIN/FA/LYCOPEN/LUTEIN .4-300-25
1 TABLET ORAL DAILY
Qty: 28 TABLET | Refills: 0 | Status: SHIPPED | OUTPATIENT
Start: 2024-05-07

## 2024-05-07 RX ORDER — OMEPRAZOLE 20 MG/1
CAPSULE, DELAYED RELEASE ORAL
Qty: 28 CAPSULE | Refills: 0 | Status: SHIPPED | OUTPATIENT
Start: 2024-05-07

## 2024-05-07 RX ORDER — PREDNISONE 1 MG/1
TABLET ORAL
Qty: 84 TABLET | Refills: 0 | Status: SHIPPED | OUTPATIENT
Start: 2024-05-07

## 2024-05-07 RX ORDER — CRANBERRY FRUIT EXTRACT 250 MG
CAPSULE ORAL
Qty: 28 CAPSULE | Refills: 0 | Status: SHIPPED | OUTPATIENT
Start: 2024-05-07

## 2024-05-08 RX ORDER — LIDOCAINE HCL 4% 4 G/100G
1 CREAM TOPICAL 4 TIMES DAILY PRN
Qty: 76.5 G | Refills: 0 | Status: SHIPPED | OUTPATIENT
Start: 2024-05-08

## 2024-05-13 ASSESSMENT — PATIENT HEALTH QUESTIONNAIRE - PHQ9
CLINICAL INTERPRETATION OF PHQ2 SCORE: NO FURTHER SCREENING NEEDED
CLINICAL INTERPRETATION OF PHQ2 SCORE: 1
1. LITTLE INTEREST OR PLEASURE IN DOING THINGS: SEVERAL DAYS
2. FEELING DOWN, DEPRESSED OR HOPELESS: NOT AT ALL
SUM OF ALL RESPONSES TO PHQ9 QUESTIONS 1 AND 2: 1

## 2024-05-16 ENCOUNTER — APPOINTMENT (OUTPATIENT)
Dept: INTERNAL MEDICINE | Age: 89
End: 2024-05-16

## 2024-05-16 ENCOUNTER — TELEPHONE (OUTPATIENT)
Dept: INTERNAL MEDICINE | Age: 89
End: 2024-05-16

## 2024-05-16 VITALS — RESPIRATION RATE: 16 BRPM | DIASTOLIC BLOOD PRESSURE: 60 MMHG | HEART RATE: 72 BPM | SYSTOLIC BLOOD PRESSURE: 120 MMHG

## 2024-05-16 DIAGNOSIS — G62.9 PERIPHERAL POLYNEUROPATHY: ICD-10-CM

## 2024-05-16 DIAGNOSIS — Z00.00 MEDICARE ANNUAL WELLNESS VISIT, SUBSEQUENT: Primary | ICD-10-CM

## 2024-05-16 DIAGNOSIS — R41.0 CONFUSION: ICD-10-CM

## 2024-05-16 DIAGNOSIS — L30.9 DERMATITIS: ICD-10-CM

## 2024-05-16 DIAGNOSIS — R82.90 MALODOROUS URINE: ICD-10-CM

## 2024-05-16 DIAGNOSIS — R32 URINARY INCONTINENCE, UNSPECIFIED TYPE: ICD-10-CM

## 2024-05-16 LAB
APPEARANCE UR: ABNORMAL
BACTERIA #/AREA URNS HPF: ABNORMAL /HPF
BILIRUB UR QL STRIP: NEGATIVE
COLOR UR: ABNORMAL
GLUCOSE UR STRIP-MCNC: NEGATIVE MG/DL
HGB UR QL STRIP: NEGATIVE
HYALINE CASTS #/AREA URNS LPF: ABNORMAL /LPF
KETONES UR STRIP-MCNC: NEGATIVE MG/DL
LEUKOCYTE ESTERASE UR QL STRIP: ABNORMAL
NITRITE UR QL STRIP: NEGATIVE
PH UR STRIP: 6 [PH] (ref 5–7)
PROT UR STRIP-MCNC: ABNORMAL MG/DL
RBC #/AREA URNS HPF: ABNORMAL /HPF
SP GR UR STRIP: 1.01 (ref 1–1.03)
SQUAMOUS #/AREA URNS HPF: ABNORMAL /HPF
UROBILINOGEN UR STRIP-MCNC: 0.2 MG/DL
WBC #/AREA URNS HPF: >100 /HPF

## 2024-05-16 PROCEDURE — 87086 URINE CULTURE/COLONY COUNT: CPT | Performed by: CLINICAL MEDICAL LABORATORY

## 2024-05-16 PROCEDURE — 81001 URINALYSIS AUTO W/SCOPE: CPT | Performed by: CLINICAL MEDICAL LABORATORY

## 2024-05-16 RX ORDER — POLYETHYLENE GLYCOL 3350 17 G/17G
17 POWDER, FOR SOLUTION ORAL DAILY
COMMUNITY

## 2024-05-16 RX ORDER — GUAIFENESIN 600 MG/1
600 TABLET, EXTENDED RELEASE ORAL 2 TIMES DAILY
COMMUNITY

## 2024-05-16 RX ORDER — ACETAMINOPHEN 325 MG/1
650 TABLET ORAL EVERY 4 HOURS PRN
COMMUNITY

## 2024-05-16 RX ORDER — IPRATROPIUM BROMIDE AND ALBUTEROL SULFATE 2.5; .5 MG/3ML; MG/3ML
3 SOLUTION RESPIRATORY (INHALATION) EVERY 6 HOURS PRN
COMMUNITY

## 2024-05-16 RX ORDER — SENNOSIDES A AND B 8.6 MG/1
1 TABLET, FILM COATED ORAL DAILY PRN
COMMUNITY

## 2024-05-16 RX ORDER — TRIAMCINOLONE ACETONIDE 1 MG/G
1 CREAM TOPICAL 2 TIMES DAILY
Qty: 30 G | Refills: 0 | Status: SHIPPED | OUTPATIENT
Start: 2024-05-16

## 2024-05-16 RX ORDER — BENZONATATE 100 MG/1
100 CAPSULE ORAL 3 TIMES DAILY PRN
COMMUNITY

## 2024-05-16 RX ORDER — DEXTROMETHORPHAN POLISTIREX 30 MG/5ML
60 SUSPENSION ORAL PRN
COMMUNITY

## 2024-05-16 RX ORDER — TRIAMCINOLONE ACETONIDE 1 MG/G
1 CREAM TOPICAL 2 TIMES DAILY
Qty: 30 G | Refills: 0 | Status: SHIPPED | OUTPATIENT
Start: 2024-05-16 | End: 2024-05-16 | Stop reason: SDUPTHER

## 2024-05-17 ENCOUNTER — TELEPHONE (OUTPATIENT)
Dept: INTERNAL MEDICINE | Age: 89
End: 2024-05-17

## 2024-05-17 LAB — BACTERIA UR CULT: NORMAL

## 2024-05-17 RX ORDER — LIDOCAINE 5% 5 G/100G
1 CREAM TOPICAL 4 TIMES DAILY PRN
Qty: 30 G | Refills: 0 | Status: SHIPPED | OUTPATIENT
Start: 2024-05-17

## 2024-05-17 RX ORDER — CEFDINIR 300 MG/1
300 CAPSULE ORAL 2 TIMES DAILY
Qty: 14 CAPSULE | Refills: 0 | Status: SHIPPED | OUTPATIENT
Start: 2024-05-17 | End: 2024-05-24

## 2024-05-20 ENCOUNTER — E-ADVICE (OUTPATIENT)
Dept: INTERNAL MEDICINE | Age: 89
End: 2024-05-20

## 2024-05-21 ENCOUNTER — E-ADVICE (OUTPATIENT)
Dept: INTERNAL MEDICINE | Age: 89
End: 2024-05-21

## 2024-05-28 DIAGNOSIS — L30.9 DERMATITIS: ICD-10-CM

## 2024-05-28 PROBLEM — E78.5 HYPERLIPIDEMIA: Status: ACTIVE | Noted: 2018-12-13

## 2024-05-28 PROBLEM — I25.10 CORONARY ARTERIOSCLEROSIS: Status: ACTIVE | Noted: 2024-05-28

## 2024-05-28 PROBLEM — M19.90 OSTEOARTHRITIS: Status: ACTIVE | Noted: 2024-05-28

## 2024-05-28 PROBLEM — N18.30 CHRONIC KIDNEY DISEASE, STAGE 3  (CMD): Status: ACTIVE | Noted: 2020-07-21

## 2024-05-28 PROBLEM — K21.9 GASTROESOPHAGEAL REFLUX DISEASE: Status: ACTIVE | Noted: 2024-05-28

## 2024-05-28 PROBLEM — M48.061 SPINAL STENOSIS OF LUMBAR REGION: Status: ACTIVE | Noted: 2021-04-05

## 2024-05-28 PROBLEM — G62.9 PERIPHERAL POLYNEUROPATHY: Status: ACTIVE | Noted: 2024-05-28

## 2024-05-28 PROBLEM — M06.9 RHEUMATOID ARTHRITIS  (CMD): Status: ACTIVE | Noted: 2024-05-28

## 2024-05-28 RX ORDER — TRIAMCINOLONE ACETONIDE 1 MG/G
CREAM TOPICAL
Qty: 30 G | Refills: 11 | OUTPATIENT
Start: 2024-05-28

## 2024-05-31 ENCOUNTER — TELEPHONE (OUTPATIENT)
Dept: WOUND CARE | Age: 89
End: 2024-05-31

## 2024-06-04 ENCOUNTER — TELEPHONE (OUTPATIENT)
Dept: INTERNAL MEDICINE | Age: 89
End: 2024-06-04

## 2024-06-04 RX ORDER — PREDNISONE 1 MG/1
TABLET ORAL
Qty: 84 TABLET | Refills: 1 | Status: SHIPPED | OUTPATIENT
Start: 2024-06-04

## 2024-06-04 RX ORDER — ANTIARTHRITIC COMBINATION NO.2 900 MG
TABLET ORAL
Qty: 28 TABLET | Refills: 1 | Status: SHIPPED | OUTPATIENT
Start: 2024-06-04

## 2024-06-04 RX ORDER — MULTIVIT-MIN/FA/LYCOPEN/LUTEIN .4-300-25
1 TABLET ORAL DAILY
Qty: 28 TABLET | Refills: 1 | Status: SHIPPED | OUTPATIENT
Start: 2024-06-04

## 2024-06-04 RX ORDER — CRANBERRY FRUIT EXTRACT 250 MG
CAPSULE ORAL
Qty: 28 CAPSULE | Refills: 1 | Status: SHIPPED | OUTPATIENT
Start: 2024-06-04

## 2024-06-04 RX ORDER — OMEPRAZOLE 20 MG/1
CAPSULE, DELAYED RELEASE ORAL
Qty: 28 CAPSULE | Refills: 1 | Status: SHIPPED | OUTPATIENT
Start: 2024-06-04

## 2024-06-04 RX ORDER — AMLODIPINE BESYLATE 5 MG/1
TABLET ORAL
Qty: 28 TABLET | Refills: 1 | Status: SHIPPED | OUTPATIENT
Start: 2024-06-04

## 2024-06-11 ENCOUNTER — E-ADVICE (OUTPATIENT)
Dept: INTERNAL MEDICINE | Age: 89
End: 2024-06-11

## 2024-06-17 ENCOUNTER — HOSPITAL ENCOUNTER (EMERGENCY)
Age: 89
Discharge: NURSING, REHAB OR LONG-TERM CARE | End: 2024-06-17
Attending: EMERGENCY MEDICINE

## 2024-06-17 ENCOUNTER — APPOINTMENT (OUTPATIENT)
Dept: CT IMAGING | Age: 89
End: 2024-06-17
Attending: EMERGENCY MEDICINE

## 2024-06-17 ENCOUNTER — APPOINTMENT (OUTPATIENT)
Dept: GENERAL RADIOLOGY | Age: 89
End: 2024-06-17
Attending: EMERGENCY MEDICINE

## 2024-06-17 VITALS
TEMPERATURE: 98 F | RESPIRATION RATE: 16 BRPM | DIASTOLIC BLOOD PRESSURE: 84 MMHG | HEART RATE: 99 BPM | SYSTOLIC BLOOD PRESSURE: 185 MMHG

## 2024-06-17 DIAGNOSIS — S09.90XA CLOSED HEAD INJURY, INITIAL ENCOUNTER: Primary | ICD-10-CM

## 2024-06-17 DIAGNOSIS — S50.11XA CONTUSION OF RIGHT FOREARM, INITIAL ENCOUNTER: ICD-10-CM

## 2024-06-17 PROCEDURE — 73090 X-RAY EXAM OF FOREARM: CPT | Performed by: RADIOLOGY

## 2024-06-17 PROCEDURE — 70450 CT HEAD/BRAIN W/O DYE: CPT

## 2024-06-17 PROCEDURE — 70450 CT HEAD/BRAIN W/O DYE: CPT | Performed by: RADIOLOGY

## 2024-06-17 PROCEDURE — 99283 EMERGENCY DEPT VISIT LOW MDM: CPT | Performed by: EMERGENCY MEDICINE

## 2024-06-17 PROCEDURE — 73090 X-RAY EXAM OF FOREARM: CPT

## 2024-06-17 PROCEDURE — 99284 EMERGENCY DEPT VISIT MOD MDM: CPT

## 2024-06-17 SDOH — SOCIAL STABILITY: SOCIAL INSECURITY: HOW OFTEN DOES ANYONE, INCLUDING FAMILY AND FRIENDS, PHYSICALLY HURT YOU?: NEVER

## 2024-06-17 SDOH — SOCIAL STABILITY: SOCIAL INSECURITY: HOW OFTEN DOES ANYONE, INCLUDING FAMILY AND FRIENDS, THREATEN YOU WITH HARM?: NEVER

## 2024-06-17 SDOH — SOCIAL STABILITY: SOCIAL INSECURITY: HOW OFTEN DOES ANYONE, INCLUDING FAMILY AND FRIENDS, INSULT OR TALK DOWN TO YOU?: NEVER

## 2024-06-17 SDOH — SOCIAL STABILITY: SOCIAL INSECURITY: HOW OFTEN DOES ANYONE, INCLUDING FAMILY AND FRIENDS, SCREAM OR CURSE AT YOU?: NEVER

## 2024-06-17 ASSESSMENT — PAIN SCALES - GENERAL
PAINLEVEL_OUTOF10: 0
PAINLEVEL_OUTOF10: 0

## 2024-06-18 ENCOUNTER — TELEPHONE (OUTPATIENT)
Dept: INTERNAL MEDICINE | Age: 89
End: 2024-06-18

## 2024-06-18 RX ORDER — NYSTATIN 100000 U/G
1 CREAM TOPICAL 2 TIMES DAILY PRN
Qty: 30 G | Refills: 1 | Status: SHIPPED | OUTPATIENT
Start: 2024-06-18

## 2024-06-21 ENCOUNTER — EXTERNAL LAB (OUTPATIENT)
Dept: HEALTH INFORMATION MANAGEMENT | Age: 89
End: 2024-06-21

## 2024-06-21 ENCOUNTER — TELEPHONE (OUTPATIENT)
Dept: INTERNAL MEDICINE | Age: 89
End: 2024-06-21

## 2024-06-21 DIAGNOSIS — R41.0 CONFUSION: Primary | ICD-10-CM

## 2024-06-21 LAB
APPEARANCE UR: ABNORMAL
BACTERIA #/AREA URNS HPF: ABNORMAL /HPF
BILIRUB UR QL: NEGATIVE
COLOR UR: YELLOW
GLUCOSE UR-MCNC: NEGATIVE MG/DL
HGB UR QL: ABNORMAL
HYALINE CASTS #/AREA URNS LPF: ABNORMAL /LPF
KETONES UR-MCNC: NEGATIVE MG/DL
LAB RESULT: NORMAL
LEUKOCYTE ESTERASE UR QL STRIP: ABNORMAL
NITRITE UR QL: POSITIVE
PH UR: 5.5 [PH] (ref 5–8)
PROT UR QL: ABNORMAL
RBC #/AREA URNS HPF: ABNORMAL /HPF
SP GR UR: 1.01 (ref 1–1.03)
SQUAMOUS #/AREA URNS HPF: ABNORMAL /HPF
WBC #/AREA URNS HPF: ABNORMAL /HPF

## 2024-06-24 ENCOUNTER — E-ADVICE (OUTPATIENT)
Dept: INTERNAL MEDICINE | Age: 89
End: 2024-06-24

## 2024-06-25 ENCOUNTER — TELEPHONE (OUTPATIENT)
Dept: INTERNAL MEDICINE | Age: 89
End: 2024-06-25

## 2024-06-25 RX ORDER — CIPROFLOXACIN 250 MG/1
250 TABLET, FILM COATED ORAL 2 TIMES DAILY
Qty: 6 TABLET | Refills: 0 | Status: SHIPPED | OUTPATIENT
Start: 2024-06-25 | End: 2024-06-28

## 2024-07-02 RX ORDER — POLYETHYLENE GLYCOL 3350 17 G/17G
POWDER, FOR SOLUTION ORAL
Qty: 510 G | Refills: 11 | Status: SHIPPED | OUTPATIENT
Start: 2024-07-02

## 2024-07-02 RX ORDER — ACETAMINOPHEN 325 MG/1
TABLET ORAL
Qty: 60 TABLET | Refills: 11 | Status: SHIPPED | OUTPATIENT
Start: 2024-07-02

## 2024-07-17 ENCOUNTER — APPOINTMENT (OUTPATIENT)
Dept: INTERNAL MEDICINE | Age: 89
End: 2024-07-17

## 2024-07-17 VITALS
BODY MASS INDEX: 22.72 KG/M2 | WEIGHT: 105.3 LBS | HEART RATE: 88 BPM | DIASTOLIC BLOOD PRESSURE: 56 MMHG | SYSTOLIC BLOOD PRESSURE: 108 MMHG | HEIGHT: 57 IN | RESPIRATION RATE: 16 BRPM

## 2024-07-17 DIAGNOSIS — N39.0 RECURRENT URINARY TRACT INFECTION: Primary | ICD-10-CM

## 2024-07-17 DIAGNOSIS — N18.32 STAGE 3B CHRONIC KIDNEY DISEASE  (CMD): ICD-10-CM

## 2024-07-17 DIAGNOSIS — M06.9 RHEUMATOID ARTHRITIS, INVOLVING UNSPECIFIED SITE, UNSPECIFIED WHETHER RHEUMATOID FACTOR PRESENT  (CMD): ICD-10-CM

## 2024-07-17 DIAGNOSIS — I10 PRIMARY HYPERTENSION: ICD-10-CM

## 2024-07-17 DIAGNOSIS — I25.10 CORONARY ARTERIOSCLEROSIS: ICD-10-CM

## 2024-07-17 DIAGNOSIS — R41.89 COGNITIVE IMPAIRMENT: ICD-10-CM

## 2024-07-17 DIAGNOSIS — K21.9 GASTROESOPHAGEAL REFLUX DISEASE, UNSPECIFIED WHETHER ESOPHAGITIS PRESENT: ICD-10-CM

## 2024-07-17 PROCEDURE — 99214 OFFICE O/P EST MOD 30 MIN: CPT | Performed by: STUDENT IN AN ORGANIZED HEALTH CARE EDUCATION/TRAINING PROGRAM

## 2024-07-19 ENCOUNTER — TELEPHONE (OUTPATIENT)
Dept: INTERNAL MEDICINE | Age: 89
End: 2024-07-19

## 2024-07-24 ENCOUNTER — E-ADVICE (OUTPATIENT)
Dept: INTERNAL MEDICINE | Age: 89
End: 2024-07-24

## 2024-07-24 DIAGNOSIS — R53.1 WEAKNESS: ICD-10-CM

## 2024-07-24 DIAGNOSIS — I10 PRIMARY HYPERTENSION: Primary | ICD-10-CM

## 2024-07-24 DIAGNOSIS — E87.1 HYPONATREMIA: ICD-10-CM

## 2024-07-24 DIAGNOSIS — E83.42 HYPOMAGNESEMIA: ICD-10-CM

## 2024-07-24 RX ORDER — CRANBERRY FRUIT EXTRACT 250 MG
CAPSULE ORAL
Qty: 28 CAPSULE | Refills: 5 | Status: SHIPPED | OUTPATIENT
Start: 2024-07-24

## 2024-07-24 RX ORDER — OMEPRAZOLE 20 MG/1
CAPSULE, DELAYED RELEASE ORAL
Qty: 28 CAPSULE | Refills: 5 | Status: SHIPPED | OUTPATIENT
Start: 2024-07-24

## 2024-07-24 RX ORDER — PREDNISONE 1 MG/1
TABLET ORAL
Qty: 84 TABLET | Refills: 5 | Status: SHIPPED | OUTPATIENT
Start: 2024-07-24

## 2024-07-24 RX ORDER — MULTIVIT-MIN/FA/LYCOPEN/LUTEIN .4-300-25
1 TABLET ORAL DAILY
Qty: 28 TABLET | Refills: 5 | Status: SHIPPED | OUTPATIENT
Start: 2024-07-24

## 2024-07-24 RX ORDER — AMLODIPINE BESYLATE 5 MG/1
TABLET ORAL
Qty: 28 TABLET | Refills: 5 | Status: SHIPPED | OUTPATIENT
Start: 2024-07-24

## 2024-07-24 RX ORDER — ANTIARTHRITIC COMBINATION NO.2 900 MG
TABLET ORAL
Qty: 28 TABLET | Refills: 5 | Status: SHIPPED | OUTPATIENT
Start: 2024-07-24

## 2024-08-05 ENCOUNTER — EXTERNAL LAB (OUTPATIENT)
Dept: HEALTH INFORMATION MANAGEMENT | Age: 89
End: 2024-08-05

## 2024-08-05 LAB
AMORPH SED URNS QL MICRO: ABNORMAL /HPF
APPEARANCE UR: ABNORMAL
BACTERIA #/AREA URNS HPF: ABNORMAL /HPF
BACTERIA UR CULT: ABNORMAL
BILIRUB UR QL: NEGATIVE
COLOR UR: YELLOW
GLUCOSE UR-MCNC: NEGATIVE MG/DL
HGB UR QL: ABNORMAL
HYALINE CASTS #/AREA URNS LPF: ABNORMAL /LPF
KETONES UR-MCNC: NEGATIVE MG/DL
LAB RESULT: NORMAL
LEUKOCYTE ESTERASE UR QL STRIP: ABNORMAL
NITRITE UR QL: NEGATIVE
PH UR: 8 [PH] (ref 5–8)
PROT UR QL: ABNORMAL
RBC #/AREA URNS HPF: ABNORMAL /HPF
SP GR UR: 1.01 (ref 1–1.03)
SQUAMOUS #/AREA URNS HPF: ABNORMAL /HPF
WBC #/AREA URNS HPF: ABNORMAL /HPF

## 2024-08-06 DIAGNOSIS — R39.9 GU (GENITOURINARY) SYMPTOMS: Primary | ICD-10-CM

## 2024-08-08 ENCOUNTER — TELEPHONE (OUTPATIENT)
Dept: INTERNAL MEDICINE | Age: 89
End: 2024-08-08

## 2024-08-08 RX ORDER — NITROFURANTOIN 25; 75 MG/1; MG/1
100 CAPSULE ORAL 2 TIMES DAILY
Qty: 14 CAPSULE | Refills: 0 | Status: SHIPPED | OUTPATIENT
Start: 2024-08-08 | End: 2024-08-15

## 2024-08-08 RX ORDER — AMOXICILLIN AND CLAVULANATE POTASSIUM 500; 125 MG/1; MG/1
1 TABLET, FILM COATED ORAL 2 TIMES DAILY
Qty: 14 TABLET | Refills: 0 | Status: SHIPPED | OUTPATIENT
Start: 2024-08-08 | End: 2024-08-08 | Stop reason: ALTCHOICE

## 2024-08-14 ENCOUNTER — HOSPITAL ENCOUNTER (INPATIENT)
Age: 89
Discharge: NURSING, REHAB OR LONG-TERM CARE | DRG: 682 | End: 2024-08-14
Attending: EMERGENCY MEDICINE | Admitting: INTERNAL MEDICINE

## 2024-08-14 ENCOUNTER — APPOINTMENT (OUTPATIENT)
Dept: CT IMAGING | Age: 89
DRG: 682 | End: 2024-08-14
Attending: EMERGENCY MEDICINE

## 2024-08-14 ENCOUNTER — APPOINTMENT (OUTPATIENT)
Dept: GENERAL RADIOLOGY | Age: 89
DRG: 682 | End: 2024-08-14
Attending: EMERGENCY MEDICINE

## 2024-08-14 DIAGNOSIS — E86.0 DEHYDRATION: ICD-10-CM

## 2024-08-14 DIAGNOSIS — I25.10 CORONARY ARTERIOSCLEROSIS: ICD-10-CM

## 2024-08-14 DIAGNOSIS — K21.9 GASTROESOPHAGEAL REFLUX DISEASE, UNSPECIFIED WHETHER ESOPHAGITIS PRESENT: ICD-10-CM

## 2024-08-14 DIAGNOSIS — N18.30 STAGE 3 CHRONIC KIDNEY DISEASE, UNSPECIFIED WHETHER STAGE 3A OR 3B CKD  (CMD): ICD-10-CM

## 2024-08-14 DIAGNOSIS — M35.3 POLYMYALGIA RHEUMATICA  (CMD): ICD-10-CM

## 2024-08-14 DIAGNOSIS — R53.1 WEAKNESS GENERALIZED: Primary | ICD-10-CM

## 2024-08-14 DIAGNOSIS — N17.9 ACUTE RENAL FAILURE, UNSPECIFIED ACUTE RENAL FAILURE TYPE (CMD): ICD-10-CM

## 2024-08-14 DIAGNOSIS — N17.9 ACUTE KIDNEY INJURY (CMD): ICD-10-CM

## 2024-08-14 LAB
ALBUMIN SERPL-MCNC: 2.8 G/DL (ref 3.6–5.1)
ALBUMIN/GLOB SERPL: 0.7 {RATIO} (ref 1–2.4)
ALP SERPL-CCNC: 83 UNITS/L (ref 45–117)
ALT SERPL-CCNC: 15 UNITS/L
ANION GAP SERPL CALC-SCNC: 12 MMOL/L (ref 7–19)
AST SERPL-CCNC: 21 UNITS/L
BASOPHILS # BLD: 0 K/MCL (ref 0–0.3)
BASOPHILS NFR BLD: 0 %
BILIRUB SERPL-MCNC: 0.3 MG/DL (ref 0.2–1)
BUN SERPL-MCNC: 42 MG/DL (ref 6–20)
BUN/CREAT SERPL: 24 (ref 7–25)
CALCIUM SERPL-MCNC: 9.6 MG/DL (ref 8.4–10.2)
CHLORIDE SERPL-SCNC: 93 MMOL/L (ref 97–110)
CO2 SERPL-SCNC: 26 MMOL/L (ref 21–32)
CREAT SERPL-MCNC: 1.78 MG/DL (ref 0.51–0.95)
DEPRECATED RDW RBC: 44.2 FL (ref 39–50)
EGFRCR SERPLBLD CKD-EPI 2021: 25 ML/MIN/{1.73_M2}
EOSINOPHIL # BLD: 0.3 K/MCL (ref 0–0.5)
EOSINOPHIL NFR BLD: 2 %
ERYTHROCYTE [DISTWIDTH] IN BLOOD: 13.8 % (ref 11–15)
FASTING DURATION TIME PATIENT: ABNORMAL H
FLUAV RNA RESP QL NAA+PROBE: NOT DETECTED
FLUBV RNA RESP QL NAA+PROBE: NOT DETECTED
GLOBULIN SER-MCNC: 4.1 G/DL (ref 2–4)
GLUCOSE SERPL-MCNC: 115 MG/DL (ref 70–99)
HCT VFR BLD CALC: 29.9 % (ref 36–46.5)
HGB BLD-MCNC: 10.2 G/DL (ref 12–15.5)
IMM GRANULOCYTES # BLD AUTO: 0 K/MCL (ref 0–0.2)
IMM GRANULOCYTES # BLD: 0 %
LIPASE SERPL-CCNC: 19 UNITS/L (ref 15–77)
LYMPHOCYTES # BLD: 0.5 K/MCL (ref 1–4)
LYMPHOCYTES NFR BLD: 4 %
MAGNESIUM SERPL-MCNC: 1.6 MG/DL (ref 1.7–2.4)
MCH RBC QN AUTO: 29.7 PG (ref 26–34)
MCHC RBC AUTO-ENTMCNC: 34.1 G/DL (ref 32–36.5)
MCV RBC AUTO: 87.2 FL (ref 78–100)
MONOCYTES # BLD: 1.2 K/MCL (ref 0.3–0.9)
MONOCYTES NFR BLD: 10 %
NEUTROPHILS # BLD: 9.9 K/MCL (ref 1.8–7.7)
NEUTROPHILS NFR BLD: 84 %
NRBC BLD MANUAL-RTO: 0 /100 WBC
NT-PROBNP SERPL-MCNC: 8289 PG/ML
OSMOLALITY SERPL: 282 MOSM/KG (ref 275–300)
PLATELET # BLD AUTO: 348 K/MCL (ref 140–450)
POTASSIUM SERPL-SCNC: 5.1 MMOL/L (ref 3.4–5.1)
POTASSIUM SERPL-SCNC: 5.5 MMOL/L (ref 3.4–5.1)
PROT SERPL-MCNC: 6.9 G/DL (ref 6.4–8.2)
RBC # BLD: 3.43 MIL/MCL (ref 4–5.2)
RSV AG NPH QL IA.RAPID: NOT DETECTED
SARS-COV-2 RNA RESP QL NAA+PROBE: NOT DETECTED
SERVICE CMNT-IMP: NORMAL
SERVICE CMNT-IMP: NORMAL
SODIUM SERPL-SCNC: 125 MMOL/L (ref 135–145)
TROPONIN I SERPL DL<=0.01 NG/ML-MCNC: 13 NG/L
WBC # BLD: 11.9 K/MCL (ref 4.2–11)

## 2024-08-14 PROCEDURE — 10002800 HB RX 250 W HCPCS: Performed by: NURSE PRACTITIONER

## 2024-08-14 PROCEDURE — 99285 EMERGENCY DEPT VISIT HI MDM: CPT | Performed by: EMERGENCY MEDICINE

## 2024-08-14 PROCEDURE — 10002807 HB RX 258: Performed by: NURSE PRACTITIONER

## 2024-08-14 PROCEDURE — 10004651 HB RX, NO CHARGE ITEM: Performed by: NURSE PRACTITIONER

## 2024-08-14 PROCEDURE — 85025 COMPLETE CBC W/AUTO DIFF WBC: CPT | Performed by: EMERGENCY MEDICINE

## 2024-08-14 PROCEDURE — 0241U COVID/FLU/RSV PANEL: CPT | Performed by: EMERGENCY MEDICINE

## 2024-08-14 PROCEDURE — 10002807 HB RX 258: Performed by: EMERGENCY MEDICINE

## 2024-08-14 PROCEDURE — 83735 ASSAY OF MAGNESIUM: CPT | Performed by: EMERGENCY MEDICINE

## 2024-08-14 PROCEDURE — G0378 HOSPITAL OBSERVATION PER HR: HCPCS

## 2024-08-14 PROCEDURE — 70450 CT HEAD/BRAIN W/O DYE: CPT | Performed by: RADIOLOGY

## 2024-08-14 PROCEDURE — 83880 ASSAY OF NATRIURETIC PEPTIDE: CPT | Performed by: EMERGENCY MEDICINE

## 2024-08-14 PROCEDURE — 99285 EMERGENCY DEPT VISIT HI MDM: CPT

## 2024-08-14 PROCEDURE — 10004173 HB COUNTER-THERAPY VISIT PT

## 2024-08-14 PROCEDURE — 96372 THER/PROPH/DIAG INJ SC/IM: CPT | Performed by: NURSE PRACTITIONER

## 2024-08-14 PROCEDURE — 83930 ASSAY OF BLOOD OSMOLALITY: CPT | Performed by: INTERNAL MEDICINE

## 2024-08-14 PROCEDURE — 83690 ASSAY OF LIPASE: CPT | Performed by: EMERGENCY MEDICINE

## 2024-08-14 PROCEDURE — 36415 COLL VENOUS BLD VENIPUNCTURE: CPT

## 2024-08-14 PROCEDURE — 71045 X-RAY EXAM CHEST 1 VIEW: CPT

## 2024-08-14 PROCEDURE — 10002800 HB RX 250 W HCPCS: Performed by: EMERGENCY MEDICINE

## 2024-08-14 PROCEDURE — 70450 CT HEAD/BRAIN W/O DYE: CPT

## 2024-08-14 PROCEDURE — 93010 ELECTROCARDIOGRAM REPORT: CPT | Performed by: INTERNAL MEDICINE

## 2024-08-14 PROCEDURE — 99222 1ST HOSP IP/OBS MODERATE 55: CPT | Performed by: INTERNAL MEDICINE

## 2024-08-14 PROCEDURE — 93005 ELECTROCARDIOGRAM TRACING: CPT | Performed by: EMERGENCY MEDICINE

## 2024-08-14 PROCEDURE — 84132 ASSAY OF SERUM POTASSIUM: CPT | Performed by: EMERGENCY MEDICINE

## 2024-08-14 PROCEDURE — 97162 PT EVAL MOD COMPLEX 30 MIN: CPT

## 2024-08-14 PROCEDURE — 36415 COLL VENOUS BLD VENIPUNCTURE: CPT | Performed by: EMERGENCY MEDICINE

## 2024-08-14 PROCEDURE — 84484 ASSAY OF TROPONIN QUANT: CPT | Performed by: EMERGENCY MEDICINE

## 2024-08-14 PROCEDURE — 80053 COMPREHEN METABOLIC PANEL: CPT | Performed by: EMERGENCY MEDICINE

## 2024-08-14 RX ORDER — IPRATROPIUM BROMIDE AND ALBUTEROL SULFATE 2.5; .5 MG/3ML; MG/3ML
3 SOLUTION RESPIRATORY (INHALATION) EVERY 6 HOURS PRN
Status: DISCONTINUED | OUTPATIENT
Start: 2024-08-14 | End: 2024-08-19 | Stop reason: HOSPADM

## 2024-08-14 RX ORDER — POLYETHYLENE GLYCOL 3350 17 G/17G
17 POWDER, FOR SOLUTION ORAL DAILY PRN
Status: DISCONTINUED | OUTPATIENT
Start: 2024-08-14 | End: 2024-08-19 | Stop reason: HOSPADM

## 2024-08-14 RX ORDER — PANTOPRAZOLE SODIUM 40 MG/1
40 TABLET, DELAYED RELEASE ORAL
Status: DISCONTINUED | OUTPATIENT
Start: 2024-08-15 | End: 2024-08-19 | Stop reason: HOSPADM

## 2024-08-14 RX ORDER — CEFAZOLIN SODIUM/WATER 2 G/20 ML
2000 SYRINGE (ML) INTRAVENOUS DAILY
Status: DISCONTINUED | OUTPATIENT
Start: 2024-08-14 | End: 2024-08-17

## 2024-08-14 RX ORDER — URSODIOL 250 MG/1
250 TABLET, FILM COATED ORAL DAILY
Status: DISCONTINUED | OUTPATIENT
Start: 2024-08-15 | End: 2024-08-19 | Stop reason: HOSPADM

## 2024-08-14 RX ORDER — HEPARIN SODIUM 5000 [USP'U]/ML
5000 INJECTION, SOLUTION INTRAVENOUS; SUBCUTANEOUS EVERY 8 HOURS SCHEDULED
Status: DISCONTINUED | OUTPATIENT
Start: 2024-08-14 | End: 2024-08-19 | Stop reason: HOSPADM

## 2024-08-14 RX ORDER — LIDOCAINE HYDROCHLORIDE 20 MG/ML
10 JELLY TOPICAL
Status: DISCONTINUED | OUTPATIENT
Start: 2024-08-14 | End: 2024-08-14

## 2024-08-14 RX ORDER — ACETAMINOPHEN 650 MG/1
650 SUPPOSITORY RECTAL EVERY 4 HOURS PRN
Status: DISCONTINUED | OUTPATIENT
Start: 2024-08-14 | End: 2024-08-19 | Stop reason: HOSPADM

## 2024-08-14 RX ORDER — 0.9 % SODIUM CHLORIDE 0.9 %
2 VIAL (ML) INJECTION EVERY 12 HOURS SCHEDULED
Status: DISCONTINUED | OUTPATIENT
Start: 2024-08-14 | End: 2024-08-19 | Stop reason: HOSPADM

## 2024-08-14 RX ORDER — AMOXICILLIN 250 MG
2 CAPSULE ORAL 2 TIMES DAILY PRN
Status: DISCONTINUED | OUTPATIENT
Start: 2024-08-14 | End: 2024-08-19 | Stop reason: HOSPADM

## 2024-08-14 RX ORDER — ONDANSETRON 2 MG/ML
4 INJECTION INTRAMUSCULAR; INTRAVENOUS 2 TIMES DAILY PRN
Status: DISCONTINUED | OUTPATIENT
Start: 2024-08-14 | End: 2024-08-19 | Stop reason: HOSPADM

## 2024-08-14 RX ORDER — SODIUM CHLORIDE 9 MG/ML
INJECTION, SOLUTION INTRAVENOUS CONTINUOUS
Status: DISCONTINUED | OUTPATIENT
Start: 2024-08-14 | End: 2024-08-15

## 2024-08-14 RX ORDER — PREDNISONE 1 MG/1
3 TABLET ORAL
Status: DISCONTINUED | OUTPATIENT
Start: 2024-08-15 | End: 2024-08-19 | Stop reason: HOSPADM

## 2024-08-14 RX ORDER — SENNOSIDES A AND B 8.6 MG/1
1 TABLET, FILM COATED ORAL DAILY PRN
Status: DISCONTINUED | OUTPATIENT
Start: 2024-08-14 | End: 2024-08-19 | Stop reason: HOSPADM

## 2024-08-14 RX ORDER — BISACODYL 10 MG
10 SUPPOSITORY, RECTAL RECTAL DAILY PRN
Status: DISCONTINUED | OUTPATIENT
Start: 2024-08-14 | End: 2024-08-19 | Stop reason: HOSPADM

## 2024-08-14 RX ORDER — ACETAMINOPHEN 325 MG/1
650 TABLET ORAL EVERY 4 HOURS PRN
Status: DISCONTINUED | OUTPATIENT
Start: 2024-08-14 | End: 2024-08-19 | Stop reason: HOSPADM

## 2024-08-14 RX ORDER — PEDI MULTIVIT NO.128/VITAMIN K 500 MCG/ML
1 LIQUID (ML) ORAL DAILY
Status: DISCONTINUED | OUTPATIENT
Start: 2024-08-15 | End: 2024-08-19 | Stop reason: HOSPADM

## 2024-08-14 RX ORDER — BENZONATATE 100 MG/1
100 CAPSULE ORAL 3 TIMES DAILY PRN
Status: DISCONTINUED | OUTPATIENT
Start: 2024-08-14 | End: 2024-08-19 | Stop reason: HOSPADM

## 2024-08-14 RX ADMIN — HEPARIN SODIUM 5000 UNITS: 5000 INJECTION INTRAVENOUS; SUBCUTANEOUS at 23:26

## 2024-08-14 RX ADMIN — CEFTRIAXONE SODIUM 2000 MG: 10 INJECTION, POWDER, FOR SOLUTION INTRAVENOUS at 23:28

## 2024-08-14 RX ADMIN — MAGNESIUM SULFATE HEPTAHYDRATE 2 G: 40 INJECTION, SOLUTION INTRAVENOUS at 17:23

## 2024-08-14 RX ADMIN — SODIUM CHLORIDE 500 ML: 9 INJECTION, SOLUTION INTRAVENOUS at 18:13

## 2024-08-14 RX ADMIN — SODIUM CHLORIDE 250 ML: 0.9 INJECTION, SOLUTION INTRAVENOUS at 15:47

## 2024-08-14 RX ADMIN — SODIUM CHLORIDE: 9 INJECTION, SOLUTION INTRAVENOUS at 23:28

## 2024-08-14 RX ADMIN — SODIUM CHLORIDE: 9 INJECTION, SOLUTION INTRAVENOUS at 18:13

## 2024-08-14 RX ADMIN — ACETAMINOPHEN 650 MG: 325 TABLET ORAL at 23:26

## 2024-08-14 RX ADMIN — SODIUM CHLORIDE, PRESERVATIVE FREE 2 ML: 5 INJECTION INTRAVENOUS at 23:33

## 2024-08-14 SDOH — SOCIAL STABILITY: SOCIAL NETWORK
HOW OFTEN DO YOU SEE OR TALK TO PEOPLE THAT YOU CARE ABOUT AND FEEL CLOSE TO? (FOR EXAMPLE: TALKING TO FRIENDS ON THE PHONE, VISITING FRIENDS OR FAMILY, GOING TO CHURCH OR CLUB MEETINGS): 1 OR 2 TIMES A WEEK

## 2024-08-14 SDOH — ECONOMIC STABILITY: INCOME INSECURITY: IN THE PAST 12 MONTHS, HAS THE ELECTRIC, GAS, OIL, OR WATER COMPANY THREATENED TO SHUT OFF SERVICE IN YOUR HOME?: NO

## 2024-08-14 SDOH — ECONOMIC STABILITY: HOUSING INSECURITY: WHAT IS YOUR LIVING SITUATION TODAY?: OTHER FACILITY RESIDENTS

## 2024-08-14 SDOH — ECONOMIC STABILITY: GENERAL

## 2024-08-14 SDOH — HEALTH STABILITY: PHYSICAL HEALTH: DO YOU HAVE DIFFICULTY DRESSING OR BATHING?: YES

## 2024-08-14 SDOH — SOCIAL STABILITY: SOCIAL INSECURITY: HOW OFTEN DOES ANYONE, INCLUDING FAMILY AND FRIENDS, INSULT OR TALK DOWN TO YOU?: NEVER

## 2024-08-14 SDOH — ECONOMIC STABILITY: FOOD INSECURITY: WITHIN THE PAST 12 MONTHS, THE FOOD YOU BOUGHT JUST DIDN'T LAST AND YOU DIDN'T HAVE MONEY TO GET MORE.: NEVER TRUE

## 2024-08-14 SDOH — SOCIAL STABILITY: SOCIAL INSECURITY: HOW OFTEN DOES ANYONE, INCLUDING FAMILY AND FRIENDS, SCREAM OR CURSE AT YOU?: NEVER

## 2024-08-14 SDOH — SOCIAL STABILITY: SOCIAL INSECURITY: HOW OFTEN DOES ANYONE, INCLUDING FAMILY AND FRIENDS, PHYSICALLY HURT YOU?: NEVER

## 2024-08-14 SDOH — SOCIAL STABILITY: SOCIAL INSECURITY: HOW OFTEN DOES ANYONE, INCLUDING FAMILY AND FRIENDS, THREATEN YOU WITH HARM?: NEVER

## 2024-08-14 SDOH — ECONOMIC STABILITY: HOUSING INSECURITY: WHAT IS YOUR LIVING SITUATION TODAY?: LONG TERM CARE FACILITY

## 2024-08-14 SDOH — ECONOMIC STABILITY: HOUSING INSECURITY: DO YOU HAVE PROBLEMS WITH ANY OF THE FOLLOWING?: NONE OF THE ABOVE

## 2024-08-14 SDOH — ECONOMIC STABILITY: TRANSPORTATION INSECURITY
IN THE PAST 12 MONTHS, HAS LACK OF RELIABLE TRANSPORTATION KEPT YOU FROM MEDICAL APPOINTMENTS, MEETINGS, WORK OR FROM GETTING THINGS NEEDED FOR DAILY LIVING?: NO

## 2024-08-14 SDOH — ECONOMIC STABILITY: HOUSING INSECURITY: WHAT IS YOUR LIVING SITUATION TODAY?: I HAVE A STEADY PLACE TO LIVE

## 2024-08-14 SDOH — HEALTH STABILITY: PHYSICAL HEALTH: DO YOU HAVE SERIOUS DIFFICULTY WALKING OR CLIMBING STAIRS?: YES

## 2024-08-14 SDOH — HEALTH STABILITY: GENERAL
BECAUSE OF A PHYSICAL, MENTAL, OR EMOTIONAL CONDITION, DO YOU HAVE SERIOUS DIFFICULTY CONCENTRATING, REMEMBERING OR MAKING DECISIONS?: YES

## 2024-08-14 SDOH — SOCIAL STABILITY: SOCIAL NETWORK: SUPPORT SYSTEMS: FAMILY MEMBERS

## 2024-08-14 SDOH — HEALTH STABILITY: GENERAL: BECAUSE OF A PHYSICAL, MENTAL, OR EMOTIONAL CONDITION, DO YOU HAVE DIFFICULTY DOING ERRANDS ALONE?: YES

## 2024-08-14 ASSESSMENT — ENCOUNTER SYMPTOMS
COUGH: 0
SHORTNESS OF BREATH: 0
FEVER: 0
DIZZINESS: 0
SORE THROAT: 0
BACK PAIN: 0
DIAPHORESIS: 0
CONFUSION: 0
POLYPHAGIA: 0
EYE REDNESS: 0
EYE ITCHING: 0
POLYDIPSIA: 0
WOUND: 0
COLOR CHANGE: 0
EYE DISCHARGE: 0
FACIAL ASYMMETRY: 0
CHOKING: 0
EYE PAIN: 0
CHILLS: 0
WHEEZING: 0
SINUS PAIN: 0
UNEXPECTED WEIGHT CHANGE: 0
NUMBNESS: 0
ABDOMINAL PAIN: 0
PHOTOPHOBIA: 0
HEADACHES: 0
AGITATION: 0
SEIZURES: 0
TREMORS: 0
TROUBLE SWALLOWING: 0
VOMITING: 0
WEAKNESS: 1
APPETITE CHANGE: 0
NERVOUS/ANXIOUS: 0
DIARRHEA: 0
SLEEP DISTURBANCE: 0
FATIGUE: 1
STRIDOR: 0
RHINORRHEA: 0
NAUSEA: 0
HALLUCINATIONS: 0
ADENOPATHY: 0
SINUS PRESSURE: 0
LIGHT-HEADEDNESS: 0
ABDOMINAL DISTENTION: 0
BLOOD IN STOOL: 0
CHEST TIGHTNESS: 0
CONSTIPATION: 0
SPEECH DIFFICULTY: 0
APNEA: 0
ACTIVITY CHANGE: 0
VOICE CHANGE: 0
FACIAL SWELLING: 0
BRUISES/BLEEDS EASILY: 0

## 2024-08-14 ASSESSMENT — ORIENTATION MEMORY CONCENTRATION TEST (OMCT)
SAY THE MONTHS IN REVERSE ORDER STARTING WITH LAST MONTH: 2 OR MORE ERRORS
WHAT MONTH IS IT NOW: INCORRECT
WHAT YEAR IS IT NOW (MUST BE EXACT): INCORRECT
OMCT INTERPRETATION: 11 OR GREATER: MODERATE TO SEVERE COGNITIVE IMPAIRMENT
OMCT SCORE: 22
COUNT BACKWARDS FROM 20 TO 1: 2 OR MORE ERRORS
WHAT TIME IS IT (NO WATCH OR CLOCK): INCORRECT
REPEAT THE NAME AND ADDRESS I ASKED YOU TO REMEMBER: 2 ERRORS

## 2024-08-14 ASSESSMENT — PAIN SCALES - GENERAL
PAINLEVEL_OUTOF10: 0
PAINLEVEL_OUTOF10: 2
PAINLEVEL_OUTOF10: 3
PAINLEVEL_OUTOF10: 0

## 2024-08-14 ASSESSMENT — COGNITIVE AND FUNCTIONAL STATUS - GENERAL
DO YOU HAVE SERIOUS DIFFICULTY WALKING OR CLIMBING STAIRS: YES
DO YOU HAVE DIFFICULTY DRESSING OR BATHING: YES
BASIC_MOBILITY_CONVERTED_SCORE: 39.67
BASIC_MOBILITY_RAW_SCORE: 17

## 2024-08-14 ASSESSMENT — COLUMBIA-SUICIDE SEVERITY RATING SCALE - C-SSRS
IS THE PATIENT ABLE TO COMPLETE C-SSRS: YES
6. HAVE YOU EVER DONE ANYTHING, STARTED TO DO ANYTHING, OR PREPARED TO DO ANYTHING TO END YOUR LIFE?: NO
1. WITHIN THE PAST MONTH, HAVE YOU WISHED YOU WERE DEAD OR WISHED YOU COULD GO TO SLEEP AND NOT WAKE UP?: NO
2. HAVE YOU ACTUALLY HAD ANY THOUGHTS OF KILLING YOURSELF?: NO

## 2024-08-14 ASSESSMENT — PATIENT HEALTH QUESTIONNAIRE - PHQ9
IS PATIENT ABLE TO COMPLETE PHQ2 OR PHQ9: YES
2. FEELING DOWN, DEPRESSED OR HOPELESS: NOT AT ALL
CLINICAL INTERPRETATION OF PHQ2 SCORE: NO FURTHER SCREENING NEEDED
SUM OF ALL RESPONSES TO PHQ9 QUESTIONS 1 AND 2: 0
1. LITTLE INTEREST OR PLEASURE IN DOING THINGS: NOT AT ALL
SUM OF ALL RESPONSES TO PHQ9 QUESTIONS 1 AND 2: 0

## 2024-08-14 ASSESSMENT — LIFESTYLE VARIABLES
HOW OFTEN DO YOU HAVE 6 OR MORE DRINKS ON ONE OCCASION: NEVER
HOW OFTEN DO YOU HAVE A DRINK CONTAINING ALCOHOL: NEVER
AUDIT-C TOTAL SCORE: 0
HOW MANY STANDARD DRINKS CONTAINING ALCOHOL DO YOU HAVE ON A TYPICAL DAY: 0,1 OR 2
ALCOHOL_USE_STATUS: NO OR LOW RISK WITH VALIDATED TOOL

## 2024-08-14 ASSESSMENT — ACTIVITIES OF DAILY LIVING (ADL)
ADL_BEFORE_ADMISSION: NEEDS/REQUIRES ASSISTANCE
ADL_SCORE: 7
PRIOR_ADL_BATHING: TOTAL ASSIST (TOTAL)
PRIOR_ADL_TOILETING: TOTAL ASSIST (TOTAL)
RECENT_DECLINE_ADL: YES, ACUTE ILLNESS WITHOUT THERAPY NEEDS;YES, DECLINE IN BATHING/DRESSING/FEEDING, COLLABORATE WITH PROVIDER (T)
DRESSING: NEEDS ASSISTANCE
BATHING: NEEDS ASSISTANCE
FEEDING: INDEPENDENT
TOILETING: NEEDS ASSISTANCE

## 2024-08-15 ENCOUNTER — APPOINTMENT (OUTPATIENT)
Dept: ULTRASOUND IMAGING | Age: 89
DRG: 682 | End: 2024-08-15
Attending: INTERNAL MEDICINE

## 2024-08-15 ENCOUNTER — E-ADVICE (OUTPATIENT)
Dept: INTERNAL MEDICINE | Age: 89
End: 2024-08-15

## 2024-08-15 LAB
ANION GAP SERPL CALC-SCNC: 15 MMOL/L (ref 7–19)
APPEARANCE UR: CLEAR
BACTERIA #/AREA URNS HPF: ABNORMAL /HPF
BASOPHILS # BLD: 0.1 K/MCL (ref 0–0.3)
BASOPHILS NFR BLD: 1 %
BILIRUB UR QL STRIP: NEGATIVE
BUN SERPL-MCNC: 42 MG/DL (ref 6–20)
BUN/CREAT SERPL: 22 (ref 7–25)
CALCIUM SERPL-MCNC: 9 MG/DL (ref 8.4–10.2)
CHLORIDE SERPL-SCNC: 96 MMOL/L (ref 97–110)
CO2 SERPL-SCNC: 22 MMOL/L (ref 21–32)
COLOR UR: YELLOW
CREAT SERPL-MCNC: 1.89 MG/DL (ref 0.51–0.95)
CREAT UR-MCNC: 106.99 MG/DL
DEPRECATED RDW RBC: 43.1 FL (ref 39–50)
EGFRCR SERPLBLD CKD-EPI 2021: 23 ML/MIN/{1.73_M2}
EOSINOPHIL # BLD: 0.7 K/MCL (ref 0–0.5)
EOSINOPHIL NFR BLD: 7 %
ERYTHROCYTE [DISTWIDTH] IN BLOOD: 13.7 % (ref 11–15)
FASTING DURATION TIME PATIENT: ABNORMAL H
FERRITIN SERPL-MCNC: 244 NG/ML (ref 8–252)
FOLATE SERPL-MCNC: >24 NG/ML
FOLATE SERPL-MCNC: >24 NG/ML
GLUCOSE SERPL-MCNC: 92 MG/DL (ref 70–99)
GLUCOSE UR STRIP-MCNC: NEGATIVE MG/DL
HCT VFR BLD CALC: 27.3 % (ref 36–46.5)
HGB BLD-MCNC: 9.1 G/DL (ref 12–15.5)
HGB RETIC QN AUTO: 28.2 PG (ref 28.6–36.3)
HGB UR QL STRIP: NEGATIVE
HYALINE CASTS #/AREA URNS LPF: ABNORMAL /LPF
IMM GRANULOCYTES # BLD AUTO: 0.1 K/MCL (ref 0–0.2)
IMM GRANULOCYTES # BLD: 1 %
IMM RETICS NFR: 7.6 % (ref 1.5–16)
IRON SATN MFR SERPL: 7 % (ref 15–45)
IRON SERPL-MCNC: 13 MCG/DL (ref 50–170)
KETONES UR STRIP-MCNC: NEGATIVE MG/DL
LEUKOCYTE ESTERASE UR QL STRIP: ABNORMAL
LYMPHOCYTES # BLD: 0.8 K/MCL (ref 1–4)
LYMPHOCYTES NFR BLD: 8 %
MAGNESIUM SERPL-MCNC: 2.2 MG/DL (ref 1.7–2.4)
MCH RBC QN AUTO: 28.9 PG (ref 26–34)
MCHC RBC AUTO-ENTMCNC: 33.3 G/DL (ref 32–36.5)
MCV RBC AUTO: 86.7 FL (ref 78–100)
MONOCYTES # BLD: 1.1 K/MCL (ref 0.3–0.9)
MONOCYTES NFR BLD: 11 %
NEUTROPHILS # BLD: 7.7 K/MCL (ref 1.8–7.7)
NEUTROPHILS NFR BLD: 72 %
NITRITE UR QL STRIP: NEGATIVE
NRBC BLD MANUAL-RTO: 0 /100 WBC
OSMOLALITY UR: 333 MOSM/KG (ref 50–1200)
PH UR STRIP: 5.5 [PH] (ref 5–7)
PLATELET # BLD AUTO: 318 K/MCL (ref 140–450)
POTASSIUM SERPL-SCNC: 4.7 MMOL/L (ref 3.4–5.1)
PROT UR STRIP-MCNC: ABNORMAL MG/DL
PROT UR-MCNC: 38 MG/DL
PROT/CREAT UR: 355 MGPR/GCR
RAINBOW EXTRA TUBES HOLD SPECIMEN: NORMAL
RBC # BLD: 3.15 MIL/MCL (ref 4–5.2)
RBC #/AREA URNS HPF: ABNORMAL /HPF
RETICS #: 30 K/MCL (ref 10–120)
RETICS/RBC NFR: 0.9 % (ref 0.3–2.5)
SODIUM SERPL-SCNC: 128 MMOL/L (ref 135–145)
SODIUM UR-SCNC: 31 MMOL/L
SP GR UR STRIP: 1.01 (ref 1–1.03)
SQUAMOUS #/AREA URNS HPF: ABNORMAL /HPF
TIBC SERPL-MCNC: 174 MCG/DL (ref 250–450)
TSH SERPL-ACNC: 1.15 MCUNITS/ML (ref 0.35–5)
UROBILINOGEN UR STRIP-MCNC: 0.2 MG/DL
VIT B12 SERPL-MCNC: 632 PG/ML (ref 211–911)
VIT B12 SERPL-MCNC: 632 PG/ML (ref 211–911)
WBC # BLD: 10.4 K/MCL (ref 4.2–11)
WBC #/AREA URNS HPF: ABNORMAL /HPF

## 2024-08-15 PROCEDURE — 94640 AIRWAY INHALATION TREATMENT: CPT

## 2024-08-15 PROCEDURE — 82728 ASSAY OF FERRITIN: CPT | Performed by: INTERNAL MEDICINE

## 2024-08-15 PROCEDURE — 10002800 HB RX 250 W HCPCS: Performed by: NURSE PRACTITIONER

## 2024-08-15 PROCEDURE — 99233 SBSQ HOSP IP/OBS HIGH 50: CPT | Performed by: NURSE PRACTITIONER

## 2024-08-15 PROCEDURE — 76775 US EXAM ABDO BACK WALL LIM: CPT | Performed by: RADIOLOGY

## 2024-08-15 PROCEDURE — 10004174 HB COUNTER-THERAPY VISIT SP: Performed by: SPEECH-LANGUAGE PATHOLOGIST

## 2024-08-15 PROCEDURE — 10004172 HB COUNTER-THERAPY VISIT OT

## 2024-08-15 PROCEDURE — 87086 URINE CULTURE/COLONY COUNT: CPT | Performed by: EMERGENCY MEDICINE

## 2024-08-15 PROCEDURE — G0378 HOSPITAL OBSERVATION PER HR: HCPCS

## 2024-08-15 PROCEDURE — 84443 ASSAY THYROID STIM HORMONE: CPT | Performed by: INTERNAL MEDICINE

## 2024-08-15 PROCEDURE — 82746 ASSAY OF FOLIC ACID SERUM: CPT | Performed by: INTERNAL MEDICINE

## 2024-08-15 PROCEDURE — 76770 US EXAM ABDO BACK WALL COMP: CPT

## 2024-08-15 PROCEDURE — 97166 OT EVAL MOD COMPLEX 45 MIN: CPT

## 2024-08-15 PROCEDURE — 36415 COLL VENOUS BLD VENIPUNCTURE: CPT | Performed by: NURSE PRACTITIONER

## 2024-08-15 PROCEDURE — 92610 EVALUATE SWALLOWING FUNCTION: CPT | Performed by: SPEECH-LANGUAGE PATHOLOGIST

## 2024-08-15 PROCEDURE — 83735 ASSAY OF MAGNESIUM: CPT | Performed by: NURSE PRACTITIONER

## 2024-08-15 PROCEDURE — 85046 RETICYTE/HGB CONCENTRATE: CPT | Performed by: INTERNAL MEDICINE

## 2024-08-15 PROCEDURE — 82607 VITAMIN B-12: CPT | Performed by: INTERNAL MEDICINE

## 2024-08-15 PROCEDURE — 97165 OT EVAL LOW COMPLEX 30 MIN: CPT

## 2024-08-15 PROCEDURE — S0310 HOSPITALIST VISIT: HCPCS | Performed by: NURSE PRACTITIONER

## 2024-08-15 PROCEDURE — 99222 1ST HOSP IP/OBS MODERATE 55: CPT | Performed by: INTERNAL MEDICINE

## 2024-08-15 PROCEDURE — 81001 URINALYSIS AUTO W/SCOPE: CPT | Performed by: EMERGENCY MEDICINE

## 2024-08-15 PROCEDURE — 80048 BASIC METABOLIC PNL TOTAL CA: CPT | Performed by: NURSE PRACTITIONER

## 2024-08-15 PROCEDURE — 82570 ASSAY OF URINE CREATININE: CPT | Performed by: INTERNAL MEDICINE

## 2024-08-15 PROCEDURE — 10002801 HB RX 250 W/O HCPCS: Performed by: NURSE PRACTITIONER

## 2024-08-15 PROCEDURE — 96372 THER/PROPH/DIAG INJ SC/IM: CPT | Performed by: NURSE PRACTITIONER

## 2024-08-15 PROCEDURE — 10004651 HB RX, NO CHARGE ITEM: Performed by: NURSE PRACTITIONER

## 2024-08-15 PROCEDURE — 10002803 HB RX 637: Performed by: NURSE PRACTITIONER

## 2024-08-15 PROCEDURE — 83540 ASSAY OF IRON: CPT | Performed by: INTERNAL MEDICINE

## 2024-08-15 PROCEDURE — 85025 COMPLETE CBC W/AUTO DIFF WBC: CPT | Performed by: NURSE PRACTITIONER

## 2024-08-15 PROCEDURE — 83935 ASSAY OF URINE OSMOLALITY: CPT | Performed by: INTERNAL MEDICINE

## 2024-08-15 PROCEDURE — 10000002 HB ROOM CHARGE MED SURG

## 2024-08-15 PROCEDURE — 84300 ASSAY OF URINE SODIUM: CPT | Performed by: INTERNAL MEDICINE

## 2024-08-15 PROCEDURE — 97530 THERAPEUTIC ACTIVITIES: CPT

## 2024-08-15 RX ORDER — FUROSEMIDE 10 MG/ML
20 INJECTION INTRAMUSCULAR; INTRAVENOUS ONCE
Status: COMPLETED | OUTPATIENT
Start: 2024-08-15 | End: 2024-08-15

## 2024-08-15 RX ADMIN — Medication 1 CAPSULE: at 08:22

## 2024-08-15 RX ADMIN — HEPARIN SODIUM 5000 UNITS: 5000 INJECTION INTRAVENOUS; SUBCUTANEOUS at 21:05

## 2024-08-15 RX ADMIN — PANTOPRAZOLE SODIUM 40 MG: 40 TABLET, DELAYED RELEASE ORAL at 05:23

## 2024-08-15 RX ADMIN — CEFTRIAXONE SODIUM 2000 MG: 10 INJECTION, POWDER, FOR SOLUTION INTRAVENOUS at 08:22

## 2024-08-15 RX ADMIN — FUROSEMIDE 20 MG: 10 INJECTION, SOLUTION INTRAMUSCULAR; INTRAVENOUS at 13:42

## 2024-08-15 RX ADMIN — IPRATROPIUM BROMIDE AND ALBUTEROL SULFATE 3 ML: 2.5; .5 SOLUTION RESPIRATORY (INHALATION) at 12:01

## 2024-08-15 RX ADMIN — SODIUM CHLORIDE, PRESERVATIVE FREE 2 ML: 5 INJECTION INTRAVENOUS at 21:05

## 2024-08-15 RX ADMIN — PREDNISONE 3 MG: 1 TABLET ORAL at 08:23

## 2024-08-15 RX ADMIN — HEPARIN SODIUM 5000 UNITS: 5000 INJECTION INTRAVENOUS; SUBCUTANEOUS at 05:23

## 2024-08-15 RX ADMIN — HEPARIN SODIUM 5000 UNITS: 5000 INJECTION INTRAVENOUS; SUBCUTANEOUS at 13:42

## 2024-08-15 RX ADMIN — URSODIOL 250 MG: 250 TABLET ORAL at 08:24

## 2024-08-15 RX ADMIN — SODIUM CHLORIDE, PRESERVATIVE FREE 2 ML: 5 INJECTION INTRAVENOUS at 08:24

## 2024-08-15 SDOH — ECONOMIC STABILITY: GENERAL: WOULD YOU LIKE HELP WITH ANY OF THE FOLLOWING NEEDS?: I DON'T WANT HELP WITH ANY OF THESE

## 2024-08-15 ASSESSMENT — COGNITIVE AND FUNCTIONAL STATUS - GENERAL
HELP NEEDED FOR PERSONAL GROOMING: A LOT
DAILY_ACTIVITY_RAW_SCORE: 14
HELP NEEDED FOR BATHING: A LOT
DAILY_ACTIVITY_CONVERTED_SCORE: 33.39
HELP NEEDED DRESSING REGULAR UPPER BODY CLOTHING: A LOT
HELP NEEDED FOR TOILETING: A LOT
HELP NEEDED DRESSING REGULAR LOWER BODY CLOTHING: A LOT

## 2024-08-15 ASSESSMENT — ACTIVITIES OF DAILY LIVING (ADL): EATING: MODIFIED INDEPENDENT

## 2024-08-15 ASSESSMENT — PAIN SCALES - GENERAL
PAINLEVEL_OUTOF10: 0

## 2024-08-16 LAB
ANION GAP SERPL CALC-SCNC: 12 MMOL/L (ref 7–19)
BACTERIA UR CULT: NORMAL
BUN SERPL-MCNC: 39 MG/DL (ref 6–20)
BUN/CREAT SERPL: 25 (ref 7–25)
CALCIUM SERPL-MCNC: 9.5 MG/DL (ref 8.4–10.2)
CHLORIDE SERPL-SCNC: 98 MMOL/L (ref 97–110)
CO2 SERPL-SCNC: 23 MMOL/L (ref 21–32)
CREAT SERPL-MCNC: 1.56 MG/DL (ref 0.51–0.95)
DEPRECATED RDW RBC: 42.9 FL (ref 39–50)
EGFRCR SERPLBLD CKD-EPI 2021: 29 ML/MIN/{1.73_M2}
ERYTHROCYTE [DISTWIDTH] IN BLOOD: 13.6 % (ref 11–15)
FASTING DURATION TIME PATIENT: ABNORMAL H
GLUCOSE SERPL-MCNC: 92 MG/DL (ref 70–99)
HCT VFR BLD CALC: 27.7 % (ref 36–46.5)
HGB BLD-MCNC: 9.4 G/DL (ref 12–15.5)
MAGNESIUM SERPL-MCNC: 1.8 MG/DL (ref 1.7–2.4)
MCH RBC QN AUTO: 29.3 PG (ref 26–34)
MCHC RBC AUTO-ENTMCNC: 33.9 G/DL (ref 32–36.5)
MCV RBC AUTO: 86.3 FL (ref 78–100)
NRBC BLD MANUAL-RTO: 0 /100 WBC
PLATELET # BLD AUTO: 370 K/MCL (ref 140–450)
POTASSIUM SERPL-SCNC: 4.4 MMOL/L (ref 3.4–5.1)
RBC # BLD: 3.21 MIL/MCL (ref 4–5.2)
SODIUM SERPL-SCNC: 129 MMOL/L (ref 135–145)
WBC # BLD: 9.7 K/MCL (ref 4.2–11)

## 2024-08-16 PROCEDURE — 99233 SBSQ HOSP IP/OBS HIGH 50: CPT | Performed by: NURSE PRACTITIONER

## 2024-08-16 PROCEDURE — 83735 ASSAY OF MAGNESIUM: CPT | Performed by: INTERNAL MEDICINE

## 2024-08-16 PROCEDURE — S0310 HOSPITALIST VISIT: HCPCS | Performed by: NURSE PRACTITIONER

## 2024-08-16 PROCEDURE — 10002801 HB RX 250 W/O HCPCS: Performed by: NURSE PRACTITIONER

## 2024-08-16 PROCEDURE — 85027 COMPLETE CBC AUTOMATED: CPT | Performed by: INTERNAL MEDICINE

## 2024-08-16 PROCEDURE — 10002803 HB RX 637: Performed by: NURSE PRACTITIONER

## 2024-08-16 PROCEDURE — 10004651 HB RX, NO CHARGE ITEM: Performed by: NURSE PRACTITIONER

## 2024-08-16 PROCEDURE — 99223 1ST HOSP IP/OBS HIGH 75: CPT

## 2024-08-16 PROCEDURE — 10004174 HB COUNTER-THERAPY VISIT SP

## 2024-08-16 PROCEDURE — 97535 SELF CARE MNGMENT TRAINING: CPT

## 2024-08-16 PROCEDURE — 96372 THER/PROPH/DIAG INJ SC/IM: CPT | Performed by: NURSE PRACTITIONER

## 2024-08-16 PROCEDURE — 10002803 HB RX 637: Performed by: INTERNAL MEDICINE

## 2024-08-16 PROCEDURE — 10000002 HB ROOM CHARGE MED SURG

## 2024-08-16 PROCEDURE — 36415 COLL VENOUS BLD VENIPUNCTURE: CPT | Performed by: INTERNAL MEDICINE

## 2024-08-16 PROCEDURE — 92526 ORAL FUNCTION THERAPY: CPT

## 2024-08-16 PROCEDURE — 10002800 HB RX 250 W HCPCS: Performed by: NURSE PRACTITIONER

## 2024-08-16 PROCEDURE — 10004172 HB COUNTER-THERAPY VISIT OT

## 2024-08-16 PROCEDURE — 97530 THERAPEUTIC ACTIVITIES: CPT

## 2024-08-16 PROCEDURE — 80048 BASIC METABOLIC PNL TOTAL CA: CPT | Performed by: INTERNAL MEDICINE

## 2024-08-16 PROCEDURE — 99232 SBSQ HOSP IP/OBS MODERATE 35: CPT | Performed by: INTERNAL MEDICINE

## 2024-08-16 RX ORDER — FERROUS SULFATE 325(65) MG
325 TABLET ORAL
Status: DISCONTINUED | OUTPATIENT
Start: 2024-08-16 | End: 2024-08-19 | Stop reason: HOSPADM

## 2024-08-16 RX ADMIN — URSODIOL 250 MG: 250 TABLET ORAL at 09:09

## 2024-08-16 RX ADMIN — CEFTRIAXONE SODIUM 2000 MG: 10 INJECTION, POWDER, FOR SOLUTION INTRAVENOUS at 09:08

## 2024-08-16 RX ADMIN — SODIUM CHLORIDE, PRESERVATIVE FREE 2 ML: 5 INJECTION INTRAVENOUS at 21:31

## 2024-08-16 RX ADMIN — PANTOPRAZOLE SODIUM 40 MG: 40 TABLET, DELAYED RELEASE ORAL at 06:14

## 2024-08-16 RX ADMIN — HEPARIN SODIUM 5000 UNITS: 5000 INJECTION INTRAVENOUS; SUBCUTANEOUS at 06:14

## 2024-08-16 RX ADMIN — PREDNISONE 3 MG: 1 TABLET ORAL at 09:09

## 2024-08-16 RX ADMIN — SODIUM CHLORIDE, PRESERVATIVE FREE 2 ML: 5 INJECTION INTRAVENOUS at 09:09

## 2024-08-16 RX ADMIN — IPRATROPIUM BROMIDE AND ALBUTEROL SULFATE 3 ML: 2.5; .5 SOLUTION RESPIRATORY (INHALATION) at 09:23

## 2024-08-16 RX ADMIN — HEPARIN SODIUM 5000 UNITS: 5000 INJECTION INTRAVENOUS; SUBCUTANEOUS at 16:15

## 2024-08-16 RX ADMIN — HEPARIN SODIUM 5000 UNITS: 5000 INJECTION INTRAVENOUS; SUBCUTANEOUS at 21:28

## 2024-08-16 ASSESSMENT — PAIN SCALES - PAIN ASSESSMENT IN ADVANCED DEMENTIA (PAINAD)
BODYLANGUAGE: RELAXED
TOTALSCORE: 1
BREATHING: NORMAL
NEGVOCALIZATION: OCCASIONAL MOAN OR GROAN, LOW LEVELS OF SPEECH WITH A NEGATIVE OR DISAPPROVING QUALITY
FACIALEXPRESSION: SMILING OR INEXPRESSIVE
CONSOLABILITY: NO NEED TO CONSOLE

## 2024-08-16 ASSESSMENT — COGNITIVE AND FUNCTIONAL STATUS - GENERAL
DAILY_ACTIVITY_RAW_SCORE: 14
HELP NEEDED DRESSING REGULAR LOWER BODY CLOTHING: A LOT
HELP NEEDED DRESSING REGULAR UPPER BODY CLOTHING: A LOT
HELP NEEDED FOR TOILETING: A LOT
DAILY_ACTIVITY_CONVERTED_SCORE: 33.39
HELP NEEDED FOR PERSONAL GROOMING: A LOT
HELP NEEDED FOR BATHING: A LOT

## 2024-08-16 ASSESSMENT — ACTIVITIES OF DAILY LIVING (ADL): HOME_MANAGEMENT_TIME_ENTRY: 28

## 2024-08-16 ASSESSMENT — PAIN SCALES - GENERAL
PAINLEVEL_OUTOF10: 0
PAINLEVEL_OUTOF10: 0

## 2024-08-17 VITALS
WEIGHT: 111.77 LBS | HEIGHT: 57 IN | HEART RATE: 62 BPM | SYSTOLIC BLOOD PRESSURE: 186 MMHG | OXYGEN SATURATION: 99 % | BODY MASS INDEX: 24.11 KG/M2 | DIASTOLIC BLOOD PRESSURE: 65 MMHG | RESPIRATION RATE: 16 BRPM | TEMPERATURE: 98.1 F

## 2024-08-17 LAB
ANION GAP SERPL CALC-SCNC: 14 MMOL/L (ref 7–19)
ATRIAL RATE (BPM): 54
BUN SERPL-MCNC: 33 MG/DL (ref 6–20)
BUN/CREAT SERPL: 27 (ref 7–25)
CALCIUM SERPL-MCNC: 9.6 MG/DL (ref 8.4–10.2)
CHLORIDE SERPL-SCNC: 101 MMOL/L (ref 97–110)
CO2 SERPL-SCNC: 22 MMOL/L (ref 21–32)
CREAT SERPL-MCNC: 1.24 MG/DL (ref 0.51–0.95)
DEPRECATED RDW RBC: 42.4 FL (ref 39–50)
DIASTOLIC BLOOD PRESSURE: 57
EGFRCR SERPLBLD CKD-EPI 2021: 38 ML/MIN/{1.73_M2}
ERYTHROCYTE [DISTWIDTH] IN BLOOD: 13.4 % (ref 11–15)
FASTING DURATION TIME PATIENT: ABNORMAL H
GLUCOSE SERPL-MCNC: 88 MG/DL (ref 70–99)
HCT VFR BLD CALC: 26.2 % (ref 36–46.5)
HGB BLD-MCNC: 9 G/DL (ref 12–15.5)
MAGNESIUM SERPL-MCNC: 1.5 MG/DL (ref 1.7–2.4)
MCH RBC QN AUTO: 29.6 PG (ref 26–34)
MCHC RBC AUTO-ENTMCNC: 34.4 G/DL (ref 32–36.5)
MCV RBC AUTO: 86.2 FL (ref 78–100)
NRBC BLD MANUAL-RTO: 0 /100 WBC
PLATELET # BLD AUTO: 357 K/MCL (ref 140–450)
POTASSIUM SERPL-SCNC: 4.3 MMOL/L (ref 3.4–5.1)
PR-INTERVAL (MSEC): 210
QRS-INTERVAL (MSEC): 118
QT-INTERVAL (MSEC): 430
QTC: 407
R AXIS (DEGREES): -42
RBC # BLD: 3.04 MIL/MCL (ref 4–5.2)
REPORT TEXT: NORMAL
SODIUM SERPL-SCNC: 133 MMOL/L (ref 135–145)
SYSTOLIC BLOOD PRESSURE: 127
T AXIS (DEGREES): 39
VENTRICULAR RATE EKG/MIN (BPM): 54
WBC # BLD: 7.5 K/MCL (ref 4.2–11)

## 2024-08-17 PROCEDURE — 10002800 HB RX 250 W HCPCS: Performed by: INTERNAL MEDICINE

## 2024-08-17 PROCEDURE — 10002800 HB RX 250 W HCPCS: Performed by: NURSE PRACTITIONER

## 2024-08-17 PROCEDURE — 99233 SBSQ HOSP IP/OBS HIGH 50: CPT | Performed by: INTERNAL MEDICINE

## 2024-08-17 PROCEDURE — 36415 COLL VENOUS BLD VENIPUNCTURE: CPT | Performed by: INTERNAL MEDICINE

## 2024-08-17 PROCEDURE — 10002803 HB RX 637: Performed by: NURSE PRACTITIONER

## 2024-08-17 PROCEDURE — 10000002 HB ROOM CHARGE MED SURG

## 2024-08-17 PROCEDURE — 96372 THER/PROPH/DIAG INJ SC/IM: CPT | Performed by: NURSE PRACTITIONER

## 2024-08-17 PROCEDURE — 83735 ASSAY OF MAGNESIUM: CPT | Performed by: INTERNAL MEDICINE

## 2024-08-17 PROCEDURE — 80048 BASIC METABOLIC PNL TOTAL CA: CPT | Performed by: INTERNAL MEDICINE

## 2024-08-17 PROCEDURE — 99232 SBSQ HOSP IP/OBS MODERATE 35: CPT | Performed by: INTERNAL MEDICINE

## 2024-08-17 PROCEDURE — 10002803 HB RX 637: Performed by: INTERNAL MEDICINE

## 2024-08-17 PROCEDURE — 85027 COMPLETE CBC AUTOMATED: CPT | Performed by: INTERNAL MEDICINE

## 2024-08-17 PROCEDURE — 10004651 HB RX, NO CHARGE ITEM: Performed by: NURSE PRACTITIONER

## 2024-08-17 RX ORDER — AMLODIPINE BESYLATE 10 MG/1
10 TABLET ORAL DAILY
Status: DISCONTINUED | OUTPATIENT
Start: 2024-08-18 | End: 2024-08-19 | Stop reason: HOSPADM

## 2024-08-17 RX ORDER — AMLODIPINE BESYLATE 5 MG/1
5 TABLET ORAL DAILY
Status: DISCONTINUED | OUTPATIENT
Start: 2024-08-17 | End: 2024-08-17

## 2024-08-17 RX ADMIN — CEFTRIAXONE SODIUM 2000 MG: 10 INJECTION, POWDER, FOR SOLUTION INTRAVENOUS at 08:26

## 2024-08-17 RX ADMIN — PANTOPRAZOLE SODIUM 40 MG: 40 TABLET, DELAYED RELEASE ORAL at 06:03

## 2024-08-17 RX ADMIN — URSODIOL 250 MG: 250 TABLET ORAL at 08:26

## 2024-08-17 RX ADMIN — Medication 1 CAPSULE: at 08:26

## 2024-08-17 RX ADMIN — SODIUM CHLORIDE, PRESERVATIVE FREE 2 ML: 5 INJECTION INTRAVENOUS at 08:27

## 2024-08-17 RX ADMIN — MAGNESIUM SULFATE HEPTAHYDRATE 2 G: 40 INJECTION, SOLUTION INTRAVENOUS at 11:45

## 2024-08-17 RX ADMIN — PREDNISONE 3 MG: 1 TABLET ORAL at 08:27

## 2024-08-17 RX ADMIN — SODIUM CHLORIDE, PRESERVATIVE FREE 2 ML: 5 INJECTION INTRAVENOUS at 21:13

## 2024-08-17 RX ADMIN — ACETAMINOPHEN 650 MG: 325 TABLET ORAL at 01:23

## 2024-08-17 RX ADMIN — FERROUS SULFATE TAB 325 MG (65 MG ELEMENTAL FE) 325 MG: 325 (65 FE) TAB at 08:27

## 2024-08-17 RX ADMIN — HEPARIN SODIUM 5000 UNITS: 5000 INJECTION INTRAVENOUS; SUBCUTANEOUS at 21:09

## 2024-08-17 RX ADMIN — HEPARIN SODIUM 5000 UNITS: 5000 INJECTION INTRAVENOUS; SUBCUTANEOUS at 06:03

## 2024-08-17 RX ADMIN — HEPARIN SODIUM 5000 UNITS: 5000 INJECTION INTRAVENOUS; SUBCUTANEOUS at 13:21

## 2024-08-17 RX ADMIN — AMLODIPINE BESYLATE 5 MG: 5 TABLET ORAL at 11:44

## 2024-08-17 ASSESSMENT — PAIN SCALES - PAIN ASSESSMENT IN ADVANCED DEMENTIA (PAINAD)
TOTALSCORE: 1
BODYLANGUAGE: RELAXED
FACIALEXPRESSION: SMILING OR INEXPRESSIVE
FACIALEXPRESSION: SMILING OR INEXPRESSIVE
BREATHING: NORMAL
NEGVOCALIZATION: OCCASIONAL MOAN OR GROAN, LOW LEVELS OF SPEECH WITH A NEGATIVE OR DISAPPROVING QUALITY
BODYLANGUAGE: RELAXED
BREATHING: NORMAL
CONSOLABILITY: NO NEED TO CONSOLE
CONSOLABILITY: NO NEED TO CONSOLE
TOTALSCORE: 1
CONSOLABILITY: NO NEED TO CONSOLE
TOTALSCORE: 1
FACIALEXPRESSION: SMILING OR INEXPRESSIVE
BREATHING: NORMAL
NEGVOCALIZATION: OCCASIONAL MOAN OR GROAN, LOW LEVELS OF SPEECH WITH A NEGATIVE OR DISAPPROVING QUALITY
BODYLANGUAGE: RELAXED
NEGVOCALIZATION: OCCASIONAL MOAN OR GROAN, LOW LEVELS OF SPEECH WITH A NEGATIVE OR DISAPPROVING QUALITY

## 2024-08-18 LAB
ANION GAP SERPL CALC-SCNC: 9 MMOL/L (ref 7–19)
BUN SERPL-MCNC: 24 MG/DL (ref 6–20)
BUN/CREAT SERPL: 22 (ref 7–25)
CALCIUM SERPL-MCNC: 10 MG/DL (ref 8.4–10.2)
CHLORIDE SERPL-SCNC: 98 MMOL/L (ref 97–110)
CO2 SERPL-SCNC: 29 MMOL/L (ref 21–32)
CREAT SERPL-MCNC: 1.09 MG/DL (ref 0.51–0.95)
EGFRCR SERPLBLD CKD-EPI 2021: 44 ML/MIN/{1.73_M2}
FASTING DURATION TIME PATIENT: ABNORMAL H
GLUCOSE SERPL-MCNC: 119 MG/DL (ref 70–99)
POTASSIUM SERPL-SCNC: 4.4 MMOL/L (ref 3.4–5.1)
SODIUM SERPL-SCNC: 132 MMOL/L (ref 135–145)

## 2024-08-18 PROCEDURE — 96372 THER/PROPH/DIAG INJ SC/IM: CPT | Performed by: NURSE PRACTITIONER

## 2024-08-18 PROCEDURE — 10000002 HB ROOM CHARGE MED SURG

## 2024-08-18 PROCEDURE — 36415 COLL VENOUS BLD VENIPUNCTURE: CPT | Performed by: INTERNAL MEDICINE

## 2024-08-18 PROCEDURE — 10002800 HB RX 250 W HCPCS: Performed by: NURSE PRACTITIONER

## 2024-08-18 PROCEDURE — 10002803 HB RX 637: Performed by: NURSE PRACTITIONER

## 2024-08-18 PROCEDURE — 80048 BASIC METABOLIC PNL TOTAL CA: CPT | Performed by: INTERNAL MEDICINE

## 2024-08-18 PROCEDURE — 99231 SBSQ HOSP IP/OBS SF/LOW 25: CPT | Performed by: INTERNAL MEDICINE

## 2024-08-18 PROCEDURE — 10002803 HB RX 637: Performed by: INTERNAL MEDICINE

## 2024-08-18 PROCEDURE — 10004651 HB RX, NO CHARGE ITEM: Performed by: NURSE PRACTITIONER

## 2024-08-18 PROCEDURE — 99232 SBSQ HOSP IP/OBS MODERATE 35: CPT | Performed by: INTERNAL MEDICINE

## 2024-08-18 RX ADMIN — URSODIOL 250 MG: 250 TABLET ORAL at 08:21

## 2024-08-18 RX ADMIN — HEPARIN SODIUM 5000 UNITS: 5000 INJECTION INTRAVENOUS; SUBCUTANEOUS at 21:07

## 2024-08-18 RX ADMIN — FERROUS SULFATE TAB 325 MG (65 MG ELEMENTAL FE) 325 MG: 325 (65 FE) TAB at 08:19

## 2024-08-18 RX ADMIN — HEPARIN SODIUM 5000 UNITS: 5000 INJECTION INTRAVENOUS; SUBCUTANEOUS at 14:48

## 2024-08-18 RX ADMIN — AMLODIPINE BESYLATE 10 MG: 10 TABLET ORAL at 08:19

## 2024-08-18 RX ADMIN — Medication 1 CAPSULE: at 08:19

## 2024-08-18 RX ADMIN — SODIUM CHLORIDE, PRESERVATIVE FREE 2 ML: 5 INJECTION INTRAVENOUS at 21:06

## 2024-08-18 RX ADMIN — PREDNISONE 3 MG: 1 TABLET ORAL at 08:20

## 2024-08-18 RX ADMIN — SODIUM CHLORIDE, PRESERVATIVE FREE 2 ML: 5 INJECTION INTRAVENOUS at 08:21

## 2024-08-18 ASSESSMENT — PAIN SCALES - PAIN ASSESSMENT IN ADVANCED DEMENTIA (PAINAD)
NEGVOCALIZATION: OCCASIONAL MOAN OR GROAN, LOW LEVELS OF SPEECH WITH A NEGATIVE OR DISAPPROVING QUALITY
TOTALSCORE: 1
TOTALSCORE: 1
BODYLANGUAGE: RELAXED
BREATHING: NORMAL
CONSOLABILITY: NO NEED TO CONSOLE
TOTALSCORE: 0
BREATHING: NORMAL
BREATHING: NORMAL
CONSOLABILITY: NO NEED TO CONSOLE
NEGVOCALIZATION: OCCASIONAL MOAN OR GROAN, LOW LEVELS OF SPEECH WITH A NEGATIVE OR DISAPPROVING QUALITY
CONSOLABILITY: NO NEED TO CONSOLE
BODYLANGUAGE: RELAXED
FACIALEXPRESSION: SMILING OR INEXPRESSIVE
BODYLANGUAGE: RELAXED
FACIALEXPRESSION: SMILING OR INEXPRESSIVE
FACIALEXPRESSION: SMILING OR INEXPRESSIVE

## 2024-08-19 VITALS
WEIGHT: 111.77 LBS | HEART RATE: 77 BPM | SYSTOLIC BLOOD PRESSURE: 147 MMHG | RESPIRATION RATE: 17 BRPM | OXYGEN SATURATION: 93 % | TEMPERATURE: 98.8 F | DIASTOLIC BLOOD PRESSURE: 59 MMHG | HEIGHT: 57 IN | BODY MASS INDEX: 24.11 KG/M2

## 2024-08-19 PROCEDURE — 10002803 HB RX 637: Performed by: NURSE PRACTITIONER

## 2024-08-19 PROCEDURE — 10004174 HB COUNTER-THERAPY VISIT SP: Performed by: SPEECH-LANGUAGE PATHOLOGIST

## 2024-08-19 PROCEDURE — 92526 ORAL FUNCTION THERAPY: CPT | Performed by: SPEECH-LANGUAGE PATHOLOGIST

## 2024-08-19 PROCEDURE — 99232 SBSQ HOSP IP/OBS MODERATE 35: CPT | Performed by: NURSE PRACTITIONER

## 2024-08-19 PROCEDURE — 10002800 HB RX 250 W HCPCS: Performed by: NURSE PRACTITIONER

## 2024-08-19 PROCEDURE — 96372 THER/PROPH/DIAG INJ SC/IM: CPT | Performed by: NURSE PRACTITIONER

## 2024-08-19 PROCEDURE — 10004651 HB RX, NO CHARGE ITEM: Performed by: NURSE PRACTITIONER

## 2024-08-19 PROCEDURE — 10002803 HB RX 637: Performed by: INTERNAL MEDICINE

## 2024-08-19 PROCEDURE — 99239 HOSP IP/OBS DSCHRG MGMT >30: CPT | Performed by: INTERNAL MEDICINE

## 2024-08-19 RX ORDER — AMLODIPINE BESYLATE 5 MG/1
10 TABLET ORAL DAILY
Status: SHIPPED | INPATIENT
Start: 2024-08-19 | End: 2024-08-20

## 2024-08-19 RX ORDER — FERROUS SULFATE 325(65) MG
325 TABLET ORAL
Status: SHIPPED | INPATIENT
Start: 2024-08-19 | End: 2024-08-20

## 2024-08-19 RX ADMIN — URSODIOL 250 MG: 250 TABLET ORAL at 09:41

## 2024-08-19 RX ADMIN — AMLODIPINE BESYLATE 10 MG: 10 TABLET ORAL at 09:41

## 2024-08-19 RX ADMIN — SODIUM CHLORIDE, PRESERVATIVE FREE 2 ML: 5 INJECTION INTRAVENOUS at 09:48

## 2024-08-19 RX ADMIN — PREDNISONE 3 MG: 1 TABLET ORAL at 09:42

## 2024-08-19 RX ADMIN — PANTOPRAZOLE SODIUM 40 MG: 40 TABLET, DELAYED RELEASE ORAL at 05:14

## 2024-08-19 RX ADMIN — FERROUS SULFATE TAB 325 MG (65 MG ELEMENTAL FE) 325 MG: 325 (65 FE) TAB at 09:41

## 2024-08-19 RX ADMIN — HEPARIN SODIUM 5000 UNITS: 5000 INJECTION INTRAVENOUS; SUBCUTANEOUS at 05:15

## 2024-08-19 RX ADMIN — Medication 1 CAPSULE: at 09:42

## 2024-08-20 DIAGNOSIS — Z51.5 HOSPICE CARE PATIENT: Primary | ICD-10-CM

## 2024-08-20 RX ORDER — BISACODYL 10 MG
10 SUPPOSITORY, RECTAL RECTAL DAILY PRN
Qty: 3 SUPPOSITORY | Refills: 0 | Status: SHIPPED | OUTPATIENT
Start: 2024-08-20 | End: 2024-08-20 | Stop reason: CLARIF

## 2024-08-20 RX ORDER — LORAZEPAM 0.5 MG/1
.5-1 TABLET ORAL
Qty: 20 TABLET | Refills: 0 | Status: SHIPPED | OUTPATIENT
Start: 2024-08-20 | End: 2024-08-20 | Stop reason: CLARIF

## 2024-08-20 RX ORDER — HALOPERIDOL 2 MG/ML
.5-1 SOLUTION ORAL EVERY 4 HOURS PRN
Qty: 15 ML | Refills: 0 | Status: SHIPPED | OUTPATIENT
Start: 2024-08-20 | End: 2024-08-20 | Stop reason: CLARIF

## 2024-08-20 RX ORDER — HYDROMORPHONE HYDROCHLORIDE 1 MG/ML
2-4 SOLUTION ORAL
Qty: 60 ML | Refills: 0 | Status: SHIPPED | OUTPATIENT
Start: 2024-08-20 | End: 2024-08-20 | Stop reason: CLARIF

## 2024-08-20 RX ORDER — AMLODIPINE BESYLATE 5 MG/1
10 TABLET ORAL DAILY
Qty: 60 TABLET | Refills: 0 | Status: SHIPPED | OUTPATIENT
Start: 2024-08-20

## 2024-08-20 RX ORDER — FERROUS SULFATE 325(65) MG
325 TABLET ORAL
Qty: 30 TABLET | Refills: 0 | Status: SHIPPED | OUTPATIENT
Start: 2024-08-20

## 2024-08-20 RX ORDER — HYDROMORPHONE HYDROCHLORIDE 1 MG/ML
2-4 SOLUTION ORAL
Qty: 60 ML | Refills: 0 | Status: SHIPPED | OUTPATIENT
Start: 2024-08-20

## 2024-08-20 RX ORDER — HALOPERIDOL 2 MG/ML
.5-1 SOLUTION ORAL EVERY 4 HOURS PRN
Qty: 15 ML | Refills: 0 | Status: SHIPPED | OUTPATIENT
Start: 2024-08-20

## 2024-08-20 RX ORDER — LORAZEPAM 0.5 MG/1
.5-1 TABLET ORAL
Qty: 20 TABLET | Refills: 0 | Status: SHIPPED | OUTPATIENT
Start: 2024-08-20

## 2024-08-20 RX ORDER — BISACODYL 10 MG
10 SUPPOSITORY, RECTAL RECTAL DAILY PRN
Qty: 3 SUPPOSITORY | Refills: 0 | Status: SHIPPED | OUTPATIENT
Start: 2024-08-20

## 2024-08-21 ENCOUNTER — TELEPHONE (OUTPATIENT)
Dept: CARE COORDINATION | Age: 89
End: 2024-08-21

## 2024-08-22 RX ORDER — TRIMETHOPRIM 100 MG/1
100 TABLET ORAL DAILY
Qty: 28 TABLET | Refills: 11 | OUTPATIENT
Start: 2024-08-22

## 2024-08-23 RX ORDER — TRIMETHOPRIM 100 MG/1
100 TABLET ORAL DAILY
Qty: 28 TABLET | Refills: 11 | Status: SHIPPED | OUTPATIENT
Start: 2024-08-23

## 2024-08-28 ENCOUNTER — TELEPHONE (OUTPATIENT)
Dept: CARE COORDINATION | Age: 89
End: 2024-08-28

## 2024-09-04 ENCOUNTER — TELEPHONE (OUTPATIENT)
Dept: CARE COORDINATION | Age: 89
End: 2024-09-04

## 2024-09-11 ENCOUNTER — TELEPHONE (OUTPATIENT)
Dept: CARE COORDINATION | Age: 89
End: 2024-09-11

## 2024-09-12 ENCOUNTER — TELEPHONE (OUTPATIENT)
Dept: CARE COORDINATION | Age: 89
End: 2024-09-12

## 2024-09-14 ENCOUNTER — E-ADVICE (OUTPATIENT)
Dept: OTHER | Age: 89
End: 2024-09-14

## 2024-09-14 SDOH — SOCIAL STABILITY: SOCIAL INSECURITY: DO YOU HAVE A HISTORY OF PRE-ADOLESCENT SEXUAL ABUSE?: NO

## 2024-09-18 ENCOUNTER — TELEPHONE (OUTPATIENT)
Dept: CARE COORDINATION | Age: 89
End: 2024-09-18

## 2024-09-30 ENCOUNTER — HOSPITAL ENCOUNTER (EMERGENCY)
Age: 89
Discharge: HOME | End: 2024-09-30
Attending: STUDENT IN AN ORGANIZED HEALTH CARE EDUCATION/TRAINING PROGRAM

## 2024-09-30 VITALS
SYSTOLIC BLOOD PRESSURE: 144 MMHG | BODY MASS INDEX: 24.19 KG/M2 | OXYGEN SATURATION: 94 % | RESPIRATION RATE: 18 BRPM | DIASTOLIC BLOOD PRESSURE: 70 MMHG | TEMPERATURE: 97.9 F | HEART RATE: 117 BPM | WEIGHT: 111.77 LBS
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
## 2024-09-30 DIAGNOSIS — Z51.5 HOSPICE CARE: Primary | ICD-10-CM

## 2024-09-30 PROCEDURE — 99283 EMERGENCY DEPT VISIT LOW MDM: CPT

## 2024-09-30 PROCEDURE — 99283 EMERGENCY DEPT VISIT LOW MDM: CPT | Performed by: STUDENT IN AN ORGANIZED HEALTH CARE EDUCATION/TRAINING PROGRAM

## 2024-09-30 ASSESSMENT — PAIN SCALES - GENERAL
PAINLEVEL_OUTOF10: 0
PAINLEVEL_OUTOF10: 0

## 2024-09-30 ASSESSMENT — MOVEMENT AND STRENGTH ASSESSMENTS: FACE_JAW: FACE SYMMETRICAL

## 2024-12-05 DIAGNOSIS — Z51.5 HOSPICE CARE PATIENT: ICD-10-CM

## 2024-12-05 RX ORDER — HYDROMORPHONE HYDROCHLORIDE 1 MG/ML
SOLUTION ORAL
Qty: 60 ML | Refills: 0 | Status: SHIPPED | OUTPATIENT
Start: 2024-12-05

## 2025-02-04 ENCOUNTER — APPOINTMENT (OUTPATIENT)
Dept: INTERNAL MEDICINE | Age: OVER 89
End: 2025-02-04